# Patient Record
Sex: MALE | Race: WHITE | NOT HISPANIC OR LATINO | Employment: UNEMPLOYED | URBAN - METROPOLITAN AREA
[De-identification: names, ages, dates, MRNs, and addresses within clinical notes are randomized per-mention and may not be internally consistent; named-entity substitution may affect disease eponyms.]

---

## 2024-02-13 ENCOUNTER — OFFICE VISIT (OUTPATIENT)
Dept: FAMILY MEDICINE | Facility: CLINIC | Age: 54
End: 2024-02-13
Payer: COMMERCIAL

## 2024-02-13 VITALS
RESPIRATION RATE: 18 BRPM | SYSTOLIC BLOOD PRESSURE: 118 MMHG | OXYGEN SATURATION: 95 % | BODY MASS INDEX: 38.75 KG/M2 | TEMPERATURE: 97.6 F | HEIGHT: 69 IN | WEIGHT: 261.6 LBS | DIASTOLIC BLOOD PRESSURE: 72 MMHG | HEART RATE: 119 BPM

## 2024-02-13 DIAGNOSIS — Z11.59 NEED FOR HEPATITIS C SCREENING TEST: ICD-10-CM

## 2024-02-13 DIAGNOSIS — Z93.2 STATUS POST ILEOSTOMY (H): Primary | ICD-10-CM

## 2024-02-13 DIAGNOSIS — E11.8 DIABETES MELLITUS TYPE 2 WITH COMPLICATIONS (H): ICD-10-CM

## 2024-02-13 DIAGNOSIS — E66.812 CLASS 2 SEVERE OBESITY DUE TO EXCESS CALORIES WITH SERIOUS COMORBIDITY IN ADULT, UNSPECIFIED BMI (H): ICD-10-CM

## 2024-02-13 DIAGNOSIS — Z72.0 TOBACCO ABUSE: ICD-10-CM

## 2024-02-13 DIAGNOSIS — Z11.4 ENCOUNTER FOR SCREENING FOR HIV: ICD-10-CM

## 2024-02-13 DIAGNOSIS — I10 BENIGN ESSENTIAL HYPERTENSION: ICD-10-CM

## 2024-02-13 DIAGNOSIS — E66.01 CLASS 2 SEVERE OBESITY DUE TO EXCESS CALORIES WITH SERIOUS COMORBIDITY IN ADULT, UNSPECIFIED BMI (H): ICD-10-CM

## 2024-02-13 DIAGNOSIS — Z90.49 HISTORY OF COLON RESECTION: ICD-10-CM

## 2024-02-13 DIAGNOSIS — E78.5 HYPERLIPIDEMIA LDL GOAL <100: ICD-10-CM

## 2024-02-13 DIAGNOSIS — K21.00 GASTROESOPHAGEAL REFLUX DISEASE WITH ESOPHAGITIS WITHOUT HEMORRHAGE: ICD-10-CM

## 2024-02-13 DIAGNOSIS — F20.89 OTHER SCHIZOPHRENIA (H): ICD-10-CM

## 2024-02-13 DIAGNOSIS — F32.0 CURRENT MILD EPISODE OF MAJOR DEPRESSIVE DISORDER WITHOUT PRIOR EPISODE (H): ICD-10-CM

## 2024-02-13 PROBLEM — F20.9 SCHIZOPHRENIA (H): Status: ACTIVE | Noted: 2024-02-13

## 2024-02-13 LAB
ALBUMIN SERPL BCG-MCNC: 3.8 G/DL (ref 3.5–5.2)
ALP SERPL-CCNC: 80 U/L (ref 40–150)
ALT SERPL W P-5'-P-CCNC: 10 U/L (ref 0–70)
ANION GAP SERPL CALCULATED.3IONS-SCNC: 11 MMOL/L (ref 7–15)
AST SERPL W P-5'-P-CCNC: 13 U/L (ref 0–45)
BILIRUB SERPL-MCNC: 0.3 MG/DL
BUN SERPL-MCNC: 20.2 MG/DL (ref 6–20)
CALCIUM SERPL-MCNC: 9.9 MG/DL (ref 8.6–10)
CHLORIDE SERPL-SCNC: 103 MMOL/L (ref 98–107)
CHOLEST SERPL-MCNC: 116 MG/DL
CREAT SERPL-MCNC: 1.19 MG/DL (ref 0.67–1.17)
DEPRECATED HCO3 PLAS-SCNC: 29 MMOL/L (ref 22–29)
EGFRCR SERPLBLD CKD-EPI 2021: 72 ML/MIN/1.73M2
ERYTHROCYTE [DISTWIDTH] IN BLOOD BY AUTOMATED COUNT: 14 % (ref 10–15)
FASTING STATUS PATIENT QL REPORTED: NO
FASTING STATUS PATIENT QL REPORTED: NO
GLUCOSE SERPL-MCNC: 171 MG/DL (ref 70–99)
GLUCOSE SERPL-MCNC: 171 MG/DL (ref 70–99)
HBA1C MFR BLD: 7.2 % (ref 0–5.6)
HCT VFR BLD AUTO: 44.7 % (ref 40–53)
HDLC SERPL-MCNC: 38 MG/DL
HGB BLD-MCNC: 14 G/DL (ref 13.3–17.7)
HIV 1+2 AB+HIV1 P24 AG SERPL QL IA: NONREACTIVE
LDLC SERPL CALC-MCNC: 17 MG/DL
MCH RBC QN AUTO: 30.1 PG (ref 26.5–33)
MCHC RBC AUTO-ENTMCNC: 31.3 G/DL (ref 31.5–36.5)
MCV RBC AUTO: 96 FL (ref 78–100)
NONHDLC SERPL-MCNC: 78 MG/DL
PLATELET # BLD AUTO: 315 10E3/UL (ref 150–450)
POTASSIUM SERPL-SCNC: 4.6 MMOL/L (ref 3.4–5.3)
PROT SERPL-MCNC: 7.5 G/DL (ref 6.4–8.3)
RBC # BLD AUTO: 4.65 10E6/UL (ref 4.4–5.9)
SODIUM SERPL-SCNC: 143 MMOL/L (ref 135–145)
TRIGL SERPL-MCNC: 306 MG/DL
WBC # BLD AUTO: 7.7 10E3/UL (ref 4–11)

## 2024-02-13 PROCEDURE — 36415 COLL VENOUS BLD VENIPUNCTURE: CPT | Performed by: FAMILY MEDICINE

## 2024-02-13 PROCEDURE — 86803 HEPATITIS C AB TEST: CPT | Performed by: FAMILY MEDICINE

## 2024-02-13 PROCEDURE — 80061 LIPID PANEL: CPT | Performed by: FAMILY MEDICINE

## 2024-02-13 PROCEDURE — 83036 HEMOGLOBIN GLYCOSYLATED A1C: CPT | Performed by: FAMILY MEDICINE

## 2024-02-13 PROCEDURE — 80053 COMPREHEN METABOLIC PANEL: CPT | Performed by: FAMILY MEDICINE

## 2024-02-13 PROCEDURE — 99205 OFFICE O/P NEW HI 60 MIN: CPT | Performed by: FAMILY MEDICINE

## 2024-02-13 PROCEDURE — 85027 COMPLETE CBC AUTOMATED: CPT | Performed by: FAMILY MEDICINE

## 2024-02-13 PROCEDURE — 87389 HIV-1 AG W/HIV-1&-2 AB AG IA: CPT | Performed by: FAMILY MEDICINE

## 2024-02-13 RX ORDER — IBUPROFEN 200 MG
TABLET ORAL
Qty: 100 EACH | Refills: 3 | Status: SHIPPED | OUTPATIENT
Start: 2024-02-13 | End: 2024-05-13

## 2024-02-13 RX ORDER — RISPERIDONE 2 MG/1
2 TABLET ORAL 2 TIMES DAILY
Qty: 180 TABLET | Refills: 3 | Status: SHIPPED | OUTPATIENT
Start: 2024-02-13

## 2024-02-13 RX ORDER — METOPROLOL TARTRATE 50 MG
50 TABLET ORAL 2 TIMES DAILY
COMMUNITY
Start: 2024-01-22 | End: 2024-02-13

## 2024-02-13 RX ORDER — LANOLIN ALCOHOL/MO/W.PET/CERES
400 CREAM (GRAM) TOPICAL 3 TIMES DAILY
COMMUNITY
Start: 2024-01-22 | End: 2024-02-13

## 2024-02-13 RX ORDER — ACETAMINOPHEN 325 MG/1
325 TABLET ORAL EVERY 4 HOURS PRN
COMMUNITY
Start: 2022-08-03 | End: 2024-02-13

## 2024-02-13 RX ORDER — ASPIRIN 81 MG/1
81 TABLET, COATED ORAL DAILY
Qty: 90 TABLET | Refills: 3 | Status: SHIPPED | OUTPATIENT
Start: 2024-02-13 | End: 2024-03-27

## 2024-02-13 RX ORDER — INSULIN GLARGINE 100 [IU]/ML
6 INJECTION, SOLUTION SUBCUTANEOUS AT BEDTIME
Qty: 11 ML | Refills: 1 | Status: SHIPPED | OUTPATIENT
Start: 2024-02-13 | End: 2024-08-06

## 2024-02-13 RX ORDER — DIPHENOXYLATE HCL/ATROPINE 2.5-.025MG
1 TABLET ORAL 4 TIMES DAILY PRN
Qty: 180 TABLET | Refills: 3 | Status: SHIPPED | OUTPATIENT
Start: 2024-02-13 | End: 2025-02-12

## 2024-02-13 RX ORDER — VENLAFAXINE HYDROCHLORIDE 75 MG/1
225 CAPSULE, EXTENDED RELEASE ORAL DAILY
COMMUNITY
End: 2024-02-13

## 2024-02-13 RX ORDER — ASPIRIN 81 MG/1
81 TABLET, COATED ORAL DAILY
COMMUNITY
Start: 2024-01-22 | End: 2024-02-13

## 2024-02-13 RX ORDER — OCTREOTIDE ACETATE 100 UG/ML
100 INJECTION, SOLUTION INTRAVENOUS; SUBCUTANEOUS DAILY
Qty: 90 ML | Refills: 3 | Status: SHIPPED | OUTPATIENT
Start: 2024-02-13 | End: 2024-06-11

## 2024-02-13 RX ORDER — CLOZAPINE 200 MG/1
400 TABLET ORAL DAILY
Qty: 180 TABLET | Refills: 3 | Status: SHIPPED | OUTPATIENT
Start: 2024-02-13 | End: 2024-04-10

## 2024-02-13 RX ORDER — SODIUM BICARBONATE 650 MG/1
650 TABLET ORAL 2 TIMES DAILY
Qty: 180 TABLET | Refills: 3 | Status: SHIPPED | OUTPATIENT
Start: 2024-02-13

## 2024-02-13 RX ORDER — LANOLIN ALCOHOL/MO/W.PET/CERES
400 CREAM (GRAM) TOPICAL 3 TIMES DAILY
Qty: 270 TABLET | Refills: 3 | Status: SHIPPED | OUTPATIENT
Start: 2024-02-13 | End: 2024-03-27

## 2024-02-13 RX ORDER — ATORVASTATIN CALCIUM 40 MG/1
40 TABLET, FILM COATED ORAL DAILY
Qty: 90 TABLET | Refills: 3 | Status: SHIPPED | OUTPATIENT
Start: 2024-02-13

## 2024-02-13 RX ORDER — CLOZAPINE 50 MG/1
50 TABLET ORAL DAILY
COMMUNITY
Start: 2024-01-22 | End: 2024-02-13

## 2024-02-13 RX ORDER — CLOZAPINE 50 MG/1
50 TABLET ORAL DAILY
Qty: 90 TABLET | Refills: 3 | Status: SHIPPED | OUTPATIENT
Start: 2024-02-13 | End: 2024-04-10

## 2024-02-13 RX ORDER — ATORVASTATIN CALCIUM 40 MG/1
40 TABLET, FILM COATED ORAL DAILY
COMMUNITY
Start: 2022-05-20 | End: 2024-02-13

## 2024-02-13 RX ORDER — DIPHENOXYLATE HCL/ATROPINE 2.5-.025MG
1 TABLET ORAL 4 TIMES DAILY PRN
COMMUNITY
Start: 2022-08-11 | End: 2024-02-13

## 2024-02-13 RX ORDER — ACETAMINOPHEN 325 MG/1
325 TABLET ORAL EVERY 4 HOURS PRN
Qty: 180 TABLET | Refills: 3 | Status: SHIPPED | OUTPATIENT
Start: 2024-02-13

## 2024-02-13 RX ORDER — CLOZAPINE 200 MG/1
400 TABLET ORAL DAILY
COMMUNITY
Start: 2023-11-28 | End: 2024-02-13

## 2024-02-13 RX ORDER — LOPERAMIDE HCL 2 MG
2 CAPSULE ORAL 4 TIMES DAILY PRN
Qty: 180 CAPSULE | Refills: 3 | Status: SHIPPED | OUTPATIENT
Start: 2024-02-13

## 2024-02-13 RX ORDER — METOPROLOL TARTRATE 50 MG
50 TABLET ORAL 2 TIMES DAILY
Qty: 180 TABLET | Refills: 3 | Status: SHIPPED | OUTPATIENT
Start: 2024-02-13

## 2024-02-13 RX ORDER — RISPERIDONE 2 MG/1
2 TABLET ORAL 2 TIMES DAILY
COMMUNITY
End: 2024-02-13

## 2024-02-13 RX ORDER — SODIUM BICARBONATE 650 MG/1
650 TABLET ORAL 2 TIMES DAILY
COMMUNITY
Start: 2022-08-11 | End: 2024-02-13

## 2024-02-13 RX ORDER — INSULIN GLARGINE 100 [IU]/ML
6 INJECTION, SOLUTION SUBCUTANEOUS AT BEDTIME
COMMUNITY
End: 2024-02-13

## 2024-02-13 RX ORDER — LOPERAMIDE HCL 2 MG
2 CAPSULE ORAL 4 TIMES DAILY PRN
COMMUNITY
Start: 2022-08-11 | End: 2024-02-13

## 2024-02-13 RX ORDER — OCTREOTIDE ACETATE 100 UG/ML
100 INJECTION, SOLUTION INTRAVENOUS; SUBCUTANEOUS DAILY
COMMUNITY
Start: 2022-08-12 | End: 2024-02-13

## 2024-02-13 RX ORDER — VENLAFAXINE HYDROCHLORIDE 75 MG/1
225 CAPSULE, EXTENDED RELEASE ORAL DAILY
Qty: 270 CAPSULE | Refills: 3 | Status: SHIPPED | OUTPATIENT
Start: 2024-02-13 | End: 2025-02-12

## 2024-02-13 ASSESSMENT — ENCOUNTER SYMPTOMS
GASTROINTESTINAL NEGATIVE: 1
DECREASED CONCENTRATION: 0
FEVER: 0
ENDOCRINE NEGATIVE: 1
HEMATOLOGIC/LYMPHATIC NEGATIVE: 1
DIZZINESS: 0
HEADACHES: 0
PALPITATIONS: 0
FATIGUE: 0
HALLUCINATIONS: 0
ALLERGIC/IMMUNOLOGIC NEGATIVE: 1
SEIZURES: 0
COLOR CHANGE: 0
AGITATION: 0
CONFUSION: 0
APPETITE CHANGE: 0
COUGH: 0
NERVOUS/ANXIOUS: 0
EYE DISCHARGE: 0
ACTIVITY CHANGE: 0
WHEEZING: 0
SHORTNESS OF BREATH: 0
EYE PAIN: 0

## 2024-02-13 ASSESSMENT — PAIN SCALES - GENERAL: PAINLEVEL: NO PAIN (0)

## 2024-02-13 NOTE — LETTER
February 14, 2024      Jayden Cevallos  [address] [place] [address]        Dear Tia,    We are writing to inform you of your test results.    -Hepatitis C antibody screen test shows no signs of a previous hepatitis C infection.     Resulted Orders   HIV Antigen Antibody Combo   Result Value Ref Range    HIV Antigen Antibody Combo Nonreactive Nonreactive      Comment:      Negative HIV-1/-2 antigen and antibody screening test results usually indicate the absence of HIV-1 and HIV-2 infection. However, such negative results do not rule-out acute HIV infection.  If acute HIV-1 or HIV-2 infection is suspected, detection of HIV-1 or HIV-2 RNA  is recommended.    Hepatitis C Screen Reflex to HCV RNA Quant and Genotype   Result Value Ref Range    Hepatitis C Antibody Nonreactive Nonreactive      Comment:      A nonreactive screening test result does not exclude the possibility of exposure to or infection with HCV. Nonreactive screening test results in individuals with prior exposure to HCV may be due to antibody levels below the limit of detection of this assay or lack of reactivity to the HCV antigens used in this assay. Patients with recent HCV infections (<3 months from time of exposure) may have false-negative HCV antibody results due to the time needed for seroconversion (average of 8 to 9 weeks).   Lipid panel reflex to direct LDL Non-fasting   Result Value Ref Range    Cholesterol 116 <200 mg/dL    Triglycerides 306 (H) <150 mg/dL    Direct Measure HDL 38 (L) >=40 mg/dL    LDL Cholesterol Calculated 17 <=100 mg/dL    Non HDL Cholesterol 78 <130 mg/dL    Patient Fasting > 8hrs? No     Narrative    Cholesterol  Desirable:  <200 mg/dL    Triglycerides  Normal:  Less than 150 mg/dL  Borderline High:  150-199 mg/dL  High:  200-499 mg/dL  Very High:  Greater than or equal to 500 mg/dL    Direct Measure HDL  Female:  Greater than or equal to 50 mg/dL   Male:  Greater than or equal to 40 mg/dL    LDL  Cholesterol  Desirable:  <100mg/dL  Above Desirable:  100-129 mg/dL   Borderline High:  130-159 mg/dL   High:  160-189 mg/dL   Very High:  >= 190 mg/dL    Non HDL Cholesterol  Desirable:  130 mg/dL  Above Desirable:  130-159 mg/dL  Borderline High:  160-189 mg/dL  High:  190-219 mg/dL  Very High:  Greater than or equal to 220 mg/dL   Glucose   Result Value Ref Range    Glucose 171 (H) 70 - 99 mg/dL    Patient Fasting > 8hrs? No    Hemoglobin A1c   Result Value Ref Range    Hemoglobin A1C 7.2 (H) 0.0 - 5.6 %      Comment:      Normal <5.7%   Prediabetes 5.7-6.4%    Diabetes 6.5% or higher     Note: Adopted from ADA consensus guidelines.   CBC with platelets   Result Value Ref Range    WBC Count 7.7 4.0 - 11.0 10e3/uL    RBC Count 4.65 4.40 - 5.90 10e6/uL    Hemoglobin 14.0 13.3 - 17.7 g/dL    Hematocrit 44.7 40.0 - 53.0 %    MCV 96 78 - 100 fL    MCH 30.1 26.5 - 33.0 pg    MCHC 31.3 (L) 31.5 - 36.5 g/dL    RDW 14.0 10.0 - 15.0 %    Platelet Count 315 150 - 450 10e3/uL   Comprehensive metabolic panel (BMP + Alb, Alk Phos, ALT, AST, Total. Bili, TP)   Result Value Ref Range    Sodium 143 135 - 145 mmol/L      Comment:      Reference intervals for this test were updated on 09/26/2023 to more accurately reflect our healthy population. There may be differences in the flagging of prior results with similar values performed with this method. Interpretation of those prior results can be made in the context of the updated reference intervals.     Potassium 4.6 3.4 - 5.3 mmol/L    Carbon Dioxide (CO2) 29 22 - 29 mmol/L    Anion Gap 11 7 - 15 mmol/L    Urea Nitrogen 20.2 (H) 6.0 - 20.0 mg/dL    Creatinine 1.19 (H) 0.67 - 1.17 mg/dL    GFR Estimate 72 >60 mL/min/1.73m2    Calcium 9.9 8.6 - 10.0 mg/dL    Chloride 103 98 - 107 mmol/L    Glucose 171 (H) 70 - 99 mg/dL    Alkaline Phosphatase 80 40 - 150 U/L      Comment:      Reference intervals for this test were updated on 11/14/2023 to more accurately reflect our healthy  population. There may be differences in the flagging of prior results with similar values performed with this method. Interpretation of those prior results can be made in the context of the updated reference intervals.    AST 13 0 - 45 U/L      Comment:      Reference intervals for this test were updated on 6/12/2023 to more accurately reflect our healthy population. There may be differences in the flagging of prior results with similar values performed with this method. Interpretation of those prior results can be made in the context of the updated reference intervals.    ALT 10 0 - 70 U/L      Comment:      Reference intervals for this test were updated on 6/12/2023 to more accurately reflect our healthy population. There may be differences in the flagging of prior results with similar values performed with this method. Interpretation of those prior results can be made in the context of the updated reference intervals.      Protein Total 7.5 6.4 - 8.3 g/dL    Albumin 3.8 3.5 - 5.2 g/dL    Bilirubin Total 0.3 <=1.2 mg/dL       If you have any questions or concerns, please call the clinic at the number listed above.       Sincerely,      Dev Neville DO/michelle

## 2024-02-13 NOTE — LETTER
February 14, 2024      Jayden Cevallos  [address] [place] [address]        Dear ,    We are writing to inform you of your test results.    -Normal red blood cell (hgb) levels, normal white blood cell count and normal platelet levels.   -Cholesterol levels are at your goal levels.  ADVISE: continuing your medication, a regular exercise program with at least 150 minutes of aerobic exercise per week, and eating a low saturated fat/low carbohydrate diet.  Also, you should recheck this fasting cholesterol panel in 12 months.   -Liver and gallbladder tests are normal (ALT,AST, Alk phos, bilirubin), kidney function is stable (Cr, GFR), sodium is normal, potassium is normal, calcium is normal, glucose is elevated most likely due to your diabetes. ADVISE: recheck your labs in 12 months.   -A1C (test of diabetes control the last 2-3 months) is at your goal. Please continue with your current plan. Also, you should make an appointment to see me and recheck your A1C test in 6 months.   -HIV test is normal.     Resulted Orders   HIV Antigen Antibody Combo   Result Value Ref Range    HIV Antigen Antibody Combo Nonreactive Nonreactive      Comment:      Negative HIV-1/-2 antigen and antibody screening test results usually indicate the absence of HIV-1 and HIV-2 infection. However, such negative results do not rule-out acute HIV infection.  If acute HIV-1 or HIV-2 infection is suspected, detection of HIV-1 or HIV-2 RNA  is recommended.    Lipid panel reflex to direct LDL Non-fasting   Result Value Ref Range    Cholesterol 116 <200 mg/dL    Triglycerides 306 (H) <150 mg/dL    Direct Measure HDL 38 (L) >=40 mg/dL    LDL Cholesterol Calculated 17 <=100 mg/dL    Non HDL Cholesterol 78 <130 mg/dL    Patient Fasting > 8hrs? No     Narrative    Cholesterol  Desirable:  <200 mg/dL    Triglycerides  Normal:  Less than 150 mg/dL  Borderline High:  150-199 mg/dL  High:  200-499 mg/dL  Very High:  Greater than or equal to 500  mg/dL    Direct Measure HDL  Female:  Greater than or equal to 50 mg/dL   Male:  Greater than or equal to 40 mg/dL    LDL Cholesterol  Desirable:  <100mg/dL  Above Desirable:  100-129 mg/dL   Borderline High:  130-159 mg/dL   High:  160-189 mg/dL   Very High:  >= 190 mg/dL    Non HDL Cholesterol  Desirable:  130 mg/dL  Above Desirable:  130-159 mg/dL  Borderline High:  160-189 mg/dL  High:  190-219 mg/dL  Very High:  Greater than or equal to 220 mg/dL   Glucose   Result Value Ref Range    Glucose 171 (H) 70 - 99 mg/dL    Patient Fasting > 8hrs? No    Hemoglobin A1c   Result Value Ref Range    Hemoglobin A1C 7.2 (H) 0.0 - 5.6 %      Comment:      Normal <5.7%   Prediabetes 5.7-6.4%    Diabetes 6.5% or higher     Note: Adopted from ADA consensus guidelines.   CBC with platelets   Result Value Ref Range    WBC Count 7.7 4.0 - 11.0 10e3/uL    RBC Count 4.65 4.40 - 5.90 10e6/uL    Hemoglobin 14.0 13.3 - 17.7 g/dL    Hematocrit 44.7 40.0 - 53.0 %    MCV 96 78 - 100 fL    MCH 30.1 26.5 - 33.0 pg    MCHC 31.3 (L) 31.5 - 36.5 g/dL    RDW 14.0 10.0 - 15.0 %    Platelet Count 315 150 - 450 10e3/uL   Comprehensive metabolic panel (BMP + Alb, Alk Phos, ALT, AST, Total. Bili, TP)   Result Value Ref Range    Sodium 143 135 - 145 mmol/L      Comment:      Reference intervals for this test were updated on 09/26/2023 to more accurately reflect our healthy population. There may be differences in the flagging of prior results with similar values performed with this method. Interpretation of those prior results can be made in the context of the updated reference intervals.     Potassium 4.6 3.4 - 5.3 mmol/L    Carbon Dioxide (CO2) 29 22 - 29 mmol/L    Anion Gap 11 7 - 15 mmol/L    Urea Nitrogen 20.2 (H) 6.0 - 20.0 mg/dL    Creatinine 1.19 (H) 0.67 - 1.17 mg/dL    GFR Estimate 72 >60 mL/min/1.73m2    Calcium 9.9 8.6 - 10.0 mg/dL    Chloride 103 98 - 107 mmol/L    Glucose 171 (H) 70 - 99 mg/dL    Alkaline Phosphatase 80 40 - 150 U/L       Comment:      Reference intervals for this test were updated on 11/14/2023 to more accurately reflect our healthy population. There may be differences in the flagging of prior results with similar values performed with this method. Interpretation of those prior results can be made in the context of the updated reference intervals.    AST 13 0 - 45 U/L      Comment:      Reference intervals for this test were updated on 6/12/2023 to more accurately reflect our healthy population. There may be differences in the flagging of prior results with similar values performed with this method. Interpretation of those prior results can be made in the context of the updated reference intervals.    ALT 10 0 - 70 U/L      Comment:      Reference intervals for this test were updated on 6/12/2023 to more accurately reflect our healthy population. There may be differences in the flagging of prior results with similar values performed with this method. Interpretation of those prior results can be made in the context of the updated reference intervals.      Protein Total 7.5 6.4 - 8.3 g/dL    Albumin 3.8 3.5 - 5.2 g/dL    Bilirubin Total 0.3 <=1.2 mg/dL       If you have any questions or concerns, please call the clinic at the number listed above.       Sincerely,      Dev Neville DO/michelle

## 2024-02-13 NOTE — PATIENT INSTRUCTIONS
Adam Carpenter,    Thank you for allowing [facility] to manage your care.    I ordered some blood work, please go to the laboratory to get your laboratory studies.    I sent your prescriptions to your pharmacy.    I made a gastroenterology referral, they will be calling in approximately 1 week to set up your appointment.  If you do not hear from them, please call the specialty number on your after visit.     For your convenience, test results are released as soon as they are available  Please allow 1-2 business days for me to send you a comment about your results.  If not done so, I encourage you to login into [employer] ([email or web address] to review your results in real time.     If you have any questions or concerns, please feel free to call us at [phone].    Sincerely,    Dr. Neville    Did you know?      You can schedule a video visit for follow-up appointments as well as future appointments for certain conditions.  Please see the below link.     [email or web address]    If you have not already done so,  I encourage you to sign up for [email or web address] ([email or web address]).  This will allow you to review your results, securely communicate with a provider, and schedule virtual visits as well.

## 2024-02-13 NOTE — PROGRESS NOTES
DME (Durable Medical Equipment) Orders and Documentation  Orders Placed This Encounter   Procedures     Ostomy Supplies Order     Miscellaneous DME Order        The patient was assessed and it was determined the patient is in need of the following listed DME Supplies/Equipment. Please complete supporting documentation below to demonstrate medical necessity.

## 2024-02-13 NOTE — PROGRESS NOTES
"1. 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   - insulin syringe-needle U-100 (29G X 1/2\" 1 ML) 29G X 1/2\" 1 ML miscellaneous; Use as directed  Dispense: 100 each; Refill: 3  - Adult GI  Referral - Consult Only; Future  - Comprehensive metabolic panel (BMP + Alb, Alk Phos, ALT, AST, Total. Bili, TP); Future  - Ostomy Supplies Order  - Miscellaneous DME Order  - Comprehensive metabolic panel (BMP + Alb, Alk Phos, ALT, AST, Total. Bili, TP)    2. 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 Other schizophrenia (H)  Chronic  - cloZAPine (CLOZARIL) 50 MG tablet; Take 1 tablet (50 mg) by mouth daily  Dispense: 90 tablet; Refill: 3  - cloZAPine (CLOZARIL) 200 MG tablet; Take 2 tablets (400 mg) by mouth daily With 50 mg tablet, total of 450 mg daily  Dispense: 180 tablet; Refill: 3  - risperiDONE (RISPERDAL) 2 MG tablet; Take 1 tablet (2 mg) by mouth 2 times daily  Dispense: 180 tablet; Refill: 3    4. Current mild episode of major depressive disorder without prior episode (H24)  Chronic and stable  - venlafaxine (EFFEXOR XR) 75 MG 24 hr capsule; Take 3 capsules (225 mg) by mouth daily  Dispense: 270 capsule; Refill: 3    5. Need for hepatitis C screening test  - Hepatitis C Screen Reflex to HCV RNA Quant and Genotype; Future  - Hepatitis C Screen Reflex to HCV RNA Quant and Genotype    6. Class 2 severe obesity due to excess calories with serious comorbidity in adult, unspecified BMI (H)  Stressed the importance of diet and exercise.  Most likely attributing to patient's diabetes    7. Hyperlipidemia LDL goal <100  Chronic  - Lipid panel reflex to direct LDL Non-fasting; Future  - atorvastatin (LIPITOR) 40 MG " tablet; Take 1 tablet (40 mg) by mouth daily  Dispense: 90 tablet; Refill: 3  - Lipid panel reflex to direct LDL Non-fasting    8. Tobacco abuse  Patient does not wish to quit at this time.     9. History of colon resection  - MAGNESIUM OXIDE 400 (240 Mg) MG tablet; Take 1 tablet (400 mg) by mouth 3 times daily  Dispense: 270 tablet; Refill: 3  - octreotide (SANDOSTATIN) 100 MCG/ML SOLN injection; Inject 1 mL (100 mcg) Subcutaneous daily  Dispense: 90 mL; Refill: 3  - diphenoxylate-atropine (LOMOTIL) 2.5-0.025 MG tablet; Take 1 tablet by mouth 4 times daily as needed for diarrhea  Dispense: 180 tablet; Refill: 3  - loperamide (IMODIUM) 2 MG capsule; Take 1 capsule (2 mg) by mouth 4 times daily as needed for diarrhea  Dispense: 180 capsule; Refill: 3  - sodium bicarbonate 650 MG tablet; Take 1 tablet (650 mg) by mouth 2 times daily  Dispense: 180 tablet; Refill: 3  - acetaminophen (TYLENOL) 325 MG tablet; Take 1 tablet (325 mg) by mouth every 4 hours as needed for pain  Dispense: 180 tablet; Refill: 3  - CBC with platelets; Future  - CBC with platelets    10. Benign essential hypertension  Chronic and stable  - metoprolol tartrate (LOPRESSOR) 50 MG tablet; Take 1 tablet (50 mg) by mouth 2 times daily  Dispense: 180 tablet; Refill: 3    11. Gastroesophageal reflux disease with esophagitis without hemorrhage  - omeprazole (PRILOSEC) 20 MG DR capsule; Take 1 capsule (20 mg) by mouth daily  Dispense: 90 capsule; Refill: 3    12. Encounter for screening for HIV  - HIV Antigen Antibody Combo; Future  - HIV Antigen Antibody Combo    I spent 60 minutes with patient of which 50% was spent counseling and coordinating patient's care as well as discussing patient's plan of care.      Patricia Carpenter is a 54 year old, presenting for the following health issues:  [facility] F/U      2/13/2024     8:56 AM   Additional Questions   Roomed by Nerissa   Accompanied by Manager from [facility]         2/13/2024      8:56 AM   Patient Reported Additional Medications   Patient reports taking the following new medications See list multiple meds     HPI         Hospital Follow-up Visit:    Hospital/Nursing Home/IP Rehab Facility:  [facility]   Date of Admission: 1/28/24  Date of Discharge: 1/28/24  Reason(s) for Admission: Colostomy bag    Was your hospitalization related to COVID-19? No   Problems taking medications regularly:  None  Medication changes since discharge: None  Problems adhering to non-medication therapy:  None    Establish care    2. ER follow-up: History was very difficult due to the fact that patient is a poor historian due to being schizophrenic.  Brought in by group home care giver.  Apparently patient just recently transferred to [facility] from [place].  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  He does need assistance with colostomy bag changes.  He was recently seen in the ED for ileostomy bag leakage on 2/8/24.  Currently, there are no issues with his ileostomy but he is requiring ileostomy bag refills.  He does smoke and does not wish to quit at this time. He does not have good insight regarding his medical condition.  Regarding his schizophrenia, he is currently on clozapine and risperdal.  He is also diabetic and taking lantus     Plan of care communicated with patient and caregiver           Review of Systems   Constitutional:  Negative for activity change, appetite change, fatigue and fever.   HENT: Negative.     Eyes:  Negative for pain, discharge and visual disturbance.   Respiratory:  Negative for cough, shortness of breath and wheezing.    Cardiovascular:  Negative for chest pain, palpitations and leg swelling.   Gastrointestinal: Negative.    Endocrine: Negative.    Genitourinary: Negative.    Skin:  Negative for color change and rash.   Allergic/Immunologic: Negative.    Neurological:  Negative for dizziness, seizures and headaches.  "  Hematological: Negative.    Psychiatric/Behavioral:  Negative for agitation, confusion, decreased concentration and hallucinations. The patient is not nervous/anxious.            Objective    /72   Pulse 119   Temp 97.6  F (36.4  C) (Temporal)   Resp 18   Ht 1.74 m (5' 8.5\")   Wt 118.7 kg (261 lb 9.6 oz)   SpO2 95%   BMI 39.19 kg/m    Body mass index is 39.19 kg/m .  Physical Exam  Constitutional:       General: He is not in acute distress.     Comments: Has poor insight regarding his medical condition   HENT:      Head: Normocephalic and atraumatic.   Eyes:      Conjunctiva/sclera: Conjunctivae normal.   Cardiovascular:      Rate and Rhythm: Normal rate and regular rhythm.      Heart sounds: Normal heart sounds. No murmur heard.  Pulmonary:      Effort: Pulmonary effort is normal. No respiratory distress.      Breath sounds: No wheezing or rales.   Abdominal:      Comments: Osteomy bag intact, appears C/D/I   Musculoskeletal:         General: Normal range of motion.   Skin:     Findings: No rash.   Neurological:      Mental Status: He is alert and oriented to person, place, and time.            Signed Electronically by: Dev Neville DO    "

## 2024-02-14 LAB — HCV AB SERPL QL IA: NONREACTIVE

## 2024-02-15 ENCOUNTER — TELEPHONE (OUTPATIENT)
Dept: FAMILY MEDICINE | Facility: CLINIC | Age: 54
End: 2024-02-15
Payer: COMMERCIAL

## 2024-02-15 NOTE — TELEPHONE ENCOUNTER
Pt calling to have his meds transferred from [facility] to [place]- reviewed how he can do that on his own, pt will complete this.    Aruna, RN    Triage Nurse  [facility]

## 2024-02-22 DIAGNOSIS — Z93.2 STATUS POST ILEOSTOMY (H): Primary | ICD-10-CM

## 2024-02-22 NOTE — TELEPHONE ENCOUNTER
Reason for Call:  Other     Detailed comments: Needs a refill on Vitamin D3 1000 unit capsules. One capsule by mouth once daily.    Phone Number Patient can be reached at: [facility]) [phone]     Best Time:     Can we leave a detailed message on this number? YES    Call taken on 2/22/2024 at 1:53 PM by Jacy Santos

## 2024-02-22 NOTE — TELEPHONE ENCOUNTER
Medication  is not on current med list.    Routed to PCP.    Madalyn BALESN RN  Triage Nurse  [facility]

## 2024-02-25 RX ORDER — FAMOTIDINE 20 MG
25 TABLET ORAL DAILY
Qty: 90 CAPSULE | Refills: 3 | Status: SHIPPED | OUTPATIENT
Start: 2024-02-25

## 2024-03-21 DIAGNOSIS — Z93.2 ILEOSTOMY STATUS (H): Primary | ICD-10-CM

## 2024-03-21 NOTE — PROGRESS NOTES
Spoke with Manager for facility [facility], [phone]. Pt hs no trouble with pouches ( except last week when they ran out and send him to ED)  but patient wants ileostomy take down. No referral to GI or Stoma clinic wanted at this time.

## 2024-03-22 ENCOUNTER — TRANSFERRED RECORDS (OUTPATIENT)
Dept: HEALTH INFORMATION MANAGEMENT | Facility: CLINIC | Age: 54
End: 2024-03-22

## 2024-03-27 ENCOUNTER — TELEPHONE (OUTPATIENT)
Dept: FAMILY MEDICINE | Facility: CLINIC | Age: 54
End: 2024-03-27
Payer: COMMERCIAL

## 2024-03-27 DIAGNOSIS — E11.8 DIABETES MELLITUS TYPE 2 WITH COMPLICATIONS (H): ICD-10-CM

## 2024-03-27 DIAGNOSIS — Z90.49 HISTORY OF COLON RESECTION: ICD-10-CM

## 2024-03-27 DIAGNOSIS — F20.89 OTHER SCHIZOPHRENIA (H): Primary | ICD-10-CM

## 2024-03-27 RX ORDER — LANOLIN ALCOHOL/MO/W.PET/CERES
400 CREAM (GRAM) TOPICAL 3 TIMES DAILY
Qty: 270 TABLET | Refills: 3 | Status: SHIPPED | OUTPATIENT
Start: 2024-03-27

## 2024-03-27 RX ORDER — ASPIRIN 81 MG/1
81 TABLET, COATED ORAL DAILY
Qty: 90 TABLET | Refills: 3 | Status: SHIPPED | OUTPATIENT
Start: 2024-03-27 | End: 2024-04-10

## 2024-03-27 NOTE — TELEPHONE ENCOUNTER
This patient was a transfer patient and stable on current medications.  Most recent CBC was on 2/13/24 which was within normal limits.  Psychiatry referral placed.    1. Other schizophrenia (H)  - Adult Mental Health [facility] Referral; Future

## 2024-03-27 NOTE — TELEPHONE ENCOUNTER
Dianne with patient's [facility] calling to have Mg oxide and ASA scripts resent to [facility] instead (was previously sent to [facility])  Prescriptions resent to [facility] pharmacy as requested.    Dianne also inquired about Clozapine.  Updated her on pharmacy's message, provider's message, and MH referral.  Gave phone number to schedule with psych and explained that prescription will need to come from a psych provider.   She does not think patient has a current psychiatrist.  She stated that patient was transferred from a facility that was managing and prescribing meds.  She will have their manager schedule patient with psych    Called and updated Rohan with [facility] pharmacy as well    Missy Valenzuela RN  [facility]

## 2024-03-27 NOTE — TELEPHONE ENCOUNTER
Received call from Carine, Pharmacy Technician with [facility] in [place]. She is calling regarding pt's prescription for Clozapine (RN noted this was sent to [facility] on 2/13/24, script was then transferred to [facility].) She is asking if pt has a psychiatrist? Reviewed chart and could not find any information regarding pt's psychiatrist. States Dr. Neville prescribed Clozapine and is not registered in the [facility] program to prescribe this medication. Pt is also needing monthly CBC with diff and last had this done on 1/1/24.   Pt takes a total dose of 450mg. Pt is out of the 200mg dose. Pharmacy is needing new prescriptions for the 200mg dose and 50mg dose with the name of pt's psychiatrist.     Routing to Provider to advise.    Rosario Land RN  [facility]

## 2024-04-02 ENCOUNTER — TELEPHONE (OUTPATIENT)
Dept: FAMILY MEDICINE | Facility: CLINIC | Age: 54
End: 2024-04-02

## 2024-04-02 ENCOUNTER — PATIENT OUTREACH (OUTPATIENT)
Dept: CARE COORDINATION | Facility: CLINIC | Age: 54
End: 2024-04-02

## 2024-04-02 ENCOUNTER — VIRTUAL VISIT (OUTPATIENT)
Dept: FAMILY MEDICINE | Facility: CLINIC | Age: 54
End: 2024-04-02
Payer: COMMERCIAL

## 2024-04-02 DIAGNOSIS — F20.89 OTHER SCHIZOPHRENIA (H): Primary | ICD-10-CM

## 2024-04-02 DIAGNOSIS — Z90.49 HISTORY OF COLON RESECTION: ICD-10-CM

## 2024-04-02 DIAGNOSIS — E11.8 DIABETES MELLITUS TYPE 2 WITH COMPLICATIONS (H): ICD-10-CM

## 2024-04-02 DIAGNOSIS — F20.89 OTHER SCHIZOPHRENIA (H): ICD-10-CM

## 2024-04-02 PROCEDURE — 99442 PR PHYSICIAN TELEPHONE EVALUATION 11-20 MIN: CPT | Mod: 93 | Performed by: FAMILY MEDICINE

## 2024-04-02 ASSESSMENT — ENCOUNTER SYMPTOMS
ENDOCRINE NEGATIVE: 1
GASTROINTESTINAL NEGATIVE: 1
WHEEZING: 0
DECREASED CONCENTRATION: 0
EYE DISCHARGE: 0
SHORTNESS OF BREATH: 0
ALLERGIC/IMMUNOLOGIC NEGATIVE: 1
DIZZINESS: 0
PALPITATIONS: 0
SEIZURES: 0
AGITATION: 0
HALLUCINATIONS: 0
FATIGUE: 0
HEADACHES: 0
COLOR CHANGE: 0
EYE PAIN: 0
CONFUSION: 0
FEVER: 0
COUGH: 0
APPETITE CHANGE: 0
ACTIVITY CHANGE: 0
HEMATOLOGIC/LYMPHATIC NEGATIVE: 1
NERVOUS/ANXIOUS: 0

## 2024-04-02 ASSESSMENT — PATIENT HEALTH QUESTIONNAIRE - PHQ9
SUM OF ALL RESPONSES TO PHQ QUESTIONS 1-9: 4
SUM OF ALL RESPONSES TO PHQ QUESTIONS 1-9: 4
10. IF YOU CHECKED OFF ANY PROBLEMS, HOW DIFFICULT HAVE THESE PROBLEMS MADE IT FOR YOU TO DO YOUR WORK, TAKE CARE OF THINGS AT HOME, OR GET ALONG WITH OTHER PEOPLE: NOT DIFFICULT AT ALL

## 2024-04-02 ASSESSMENT — ANXIETY QUESTIONNAIRES
GAD7 TOTAL SCORE: 2
GAD7 TOTAL SCORE: 2
7. FEELING AFRAID AS IF SOMETHING AWFUL MIGHT HAPPEN: NOT AT ALL
3. WORRYING TOO MUCH ABOUT DIFFERENT THINGS: NOT AT ALL
8. IF YOU CHECKED OFF ANY PROBLEMS, HOW DIFFICULT HAVE THESE MADE IT FOR YOU TO DO YOUR WORK, TAKE CARE OF THINGS AT HOME, OR GET ALONG WITH OTHER PEOPLE?: NOT DIFFICULT AT ALL
5. BEING SO RESTLESS THAT IT IS HARD TO SIT STILL: NOT AT ALL
GAD7 TOTAL SCORE: 2
7. FEELING AFRAID AS IF SOMETHING AWFUL MIGHT HAPPEN: NOT AT ALL
2. NOT BEING ABLE TO STOP OR CONTROL WORRYING: NOT AT ALL
IF YOU CHECKED OFF ANY PROBLEMS ON THIS QUESTIONNAIRE, HOW DIFFICULT HAVE THESE PROBLEMS MADE IT FOR YOU TO DO YOUR WORK, TAKE CARE OF THINGS AT HOME, OR GET ALONG WITH OTHER PEOPLE: NOT DIFFICULT AT ALL
6. BECOMING EASILY ANNOYED OR IRRITABLE: SEVERAL DAYS
4. TROUBLE RELAXING: SEVERAL DAYS
1. FEELING NERVOUS, ANXIOUS, OR ON EDGE: NOT AT ALL

## 2024-04-02 NOTE — PROGRESS NOTES
Jayden is a 54 year old who is being evaluated via a billable telephone visit.    What phone number would you like to be contacted at? [phone]  How would you like to obtain your AVS? Mail a copy  Originating Location (pt. Location): Assisted Living    Distant Location (provider location):  Off-site    1. Other schizophrenia (H)  Chronic and stable. Continue with Clozapine total 450 mg daily.  Keep upcoming psychiatry appointment.     2. 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  Continue with Lantus.     3. 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  Keep upcoming appointment with GI.   - Ostomy Supplies Order      Subjective   Jayden is a 54 year old, presenting for the following health issues:  Establish Care and Recheck Medication      4/2/2024    10:42 AM   Additional Questions   Roomed by Nerissa   Accompanied by Self     History of Present Illness       Mental Health Follow-up:  Patient presents to follow-up on Anxiety.    Patient's anxiety since last visit has been:  Better  The patient is not having other symptoms associated with anxiety.  Any significant life events: No  Patient is not feeling anxious or having panic attacks.  Patient has no concerns about alcohol or drug use.    He eats 0-1 servings of fruits and vegetables daily.He consumes 3 sweetened beverage(s) daily.He exercises with enough effort to increase his heart rate 10 to 19 minutes per day.  He exercises with enough effort to increase his heart rate 3 or less days per week.   He is taking medications regularly.      History of megacolon: S/P ileostomy.  Patient has upcoming gastroenterology in 2 weeks in [place].  No nausea or vomiting.  Patient has enough supplies.     2. Schizophrenia: Currently on clozapine.  Has an upcoming with psychiatry appointment.  States of mild auditory hallucinations.     Review of Systems   Constitutional:  Negative for activity change, appetite change,  fatigue and fever.   HENT: Negative.     Eyes:  Negative for pain, discharge and visual disturbance.   Respiratory:  Negative for cough, shortness of breath and wheezing.    Cardiovascular:  Negative for chest pain, palpitations and leg swelling.   Gastrointestinal: Negative.    Endocrine: Negative.    Genitourinary: Negative.    Skin:  Negative for color change and rash.   Allergic/Immunologic: Negative.    Neurological:  Negative for dizziness, seizures and headaches.   Hematological: Negative.    Psychiatric/Behavioral:  Negative for agitation, confusion, decreased concentration and hallucinations. The patient is not nervous/anxious.            Objective           Vitals:  No vitals were obtained today due to virtual visit.    Physical Exam   General: Alert and no distress //Respiratory: No audible wheeze, cough, or shortness of breath // Psychiatric:  Appropriate affect, tone, and pace of words            Phone call duration: 15  minutes  Signed Electronically by: Dev Neville DO    DME (Durable Medical Equipment) Orders and Documentation  Orders Placed This Encounter   Procedures    Ostomy Supplies Order        The patient was assessed and it was determined the patient is in need of the following listed DME Supplies/Equipment. Please complete supporting documentation below to demonstrate medical necessity.

## 2024-04-02 NOTE — TELEPHONE ENCOUNTER
Poppy manager at home stating that the patient needs the patients provider to manage his psychiatry medication (clozapine)  while he waits for his appointment in June (6/19/24). They are in need of clozapine fills. RN updated that these were sent to the pharmacy 2/13/24 with refills.     He stated [facility] already transferred these to their pharmacy and will not dispense these because they were not prescribed by a psychiatrist. Poppy stated he was told that his nurse at the facility spoke to [facility] and this is what they told the nurse.     He then put the [facility] on the call with us. They were told that they were not able to fill this because Dr. Neville is not REMs program. They stated Dr. Neville will need to be enrolled in this program. They stated he has to have blood draws each month. RN updated that the patient had a blood draw 2/13/24 and they were not willing to release this medication.     The pharmacy staff recommended doing [place] [facility] [place] the provider Dr. Neville cannot enroll this medication.     Poppy stated he will try to have the scripts transferred back to [facility] to see if they can pick them up at that location and he will update us with that.    Patient has enough medication for this week.     Please advise    Cassie Sands RN on 4/2/2024 at 2:06 PM

## 2024-04-02 NOTE — TELEPHONE ENCOUNTER
Please check if his previous psychiatrist are willing to prescribe his Clozapine and Risperdal until he establish care with a psychiatrist on 6/19/24.  I am not a psychiatrist and not enrolled in the REMs program.  Please let me know how else I can help the patient.

## 2024-04-02 NOTE — TELEPHONE ENCOUNTER
Poppy stating that the patient needs the patients provider to manage his psychiatry medications while he waits for his appointment in June (6/19/24). They are in need of clozapine fills. RN updated that these were sent to the pharmacy 2/13/24 with refills.     He stated [place] pharmacy already transferred these and will not dispense these because they were not prescribed by a psychiatrist.

## 2024-04-02 NOTE — TELEPHONE ENCOUNTER
RN called Poppy to relay pcp's message below. Poppy was asking for additional support for helping coordinate this as they are unable to find pt's previous psychiatrist. RN placed care coordination referral to help with medication coverages.     Lor Chery RN on 4/2/2024 at 2:58 PM

## 2024-04-03 ENCOUNTER — PATIENT OUTREACH (OUTPATIENT)
Dept: CARE COORDINATION | Facility: CLINIC | Age: 54
End: 2024-04-03
Payer: COMMERCIAL

## 2024-04-03 NOTE — PROGRESS NOTES
Clinic Care Coordination Contact  [facility]/Voicemail    Clinical Data: Care Coordinator Outreach    Outreach Documentation Number of Outreach Attempt   4/3/2024   3:18 PM 1       The CHW was not able to leave a voicemail for the patient at this time.    Plan: Care Coordinator will try to reach patient again in 1-2 business days.    OMID Bui  [phone]  [facility]

## 2024-04-04 NOTE — PROGRESS NOTES
Clinic Care Coordination Contact  Community Health Worker Initial Outreach    CHW Initial Information Gathering:  Referral Source: PCP  Preferred Hospital: [facility]  [phone]  Preferred Urgent Care: Other ([facility] [place])  Current living arrangement:: I live in assisted living  Type of residence:: Assisted living  No PCP office visit in Past Year: No       Patient accepts CC: Yes. Patient scheduled for assessment with the SW on 4/5/24 at 2:00 pm. Patient noted desire to discuss supports for mental health, treatment options and also Pt's home care manager Poppy is seeking assistance in getting medications for bridging. The CHW called the patient through the only number in the chart.     OMID Bui  [phone]  [facility]  [facility]

## 2024-04-05 ENCOUNTER — PATIENT OUTREACH (OUTPATIENT)
Dept: NURSING | Facility: CLINIC | Age: 54
End: 2024-04-05
Payer: COMMERCIAL

## 2024-04-05 NOTE — PROGRESS NOTES
"Clinic Care Coordination Contact     Call to patient for initial SW phone assessment, a female answered the phone.  SW inquired if she could talk with patient.  Patient then answered, he stated that the female is Amita, his PCA.  Patient states his PCA provides care 5 days per week \"all day.  SW clarified with PCA, she stated she is from [facility] [place], she provides care 8 hours per day M-F.  PCA reported she assists with bathing, dressing and homemaking needs.  She reported that patient has an ileostomy and receives wound and medical care from home care RN.      CC referral notes indicate need for mental health and treatment options.  Patient has dx of schizophrenia with auditory hallucinations noted.  Patient had reported in recent visit that his anxiety has improved and he is not having panic attacks.  Patient declined need for mental health resources.  He reports having an upcoming psychiatry appt as does PCA.  PCA was not certain of the date, but stated it is scheduled within [place].  SW noted that a psychiatry appt was scheduled in June 2024 but canceled.  Patient's PCA stated she will discuss with patient's [place]  and home care RN.  Both patient and PCA reported that patient has an assigned  wish [facility].  Patient reported he does not wish to see a therapist at time.      SW inquired with patient and PCA on possible needed medications as noted in CC referral.  PCA stated patient has all needed medications, as did patient.  SW inquired of patient if she could have patient's home care RN # to call and ensure nothing further is needed.  Patient handed phone to PCA, requesting she provide this #.  PCA provided # of Home Care RN Lameesa ([phone]).  SW left vague message for Lameesa requesting return call regarding patient.      SW will review in 1-2 days on next steps     Amada Pollock, CHINW, MSW   [facility]  Care Coordination  [facility]" [facility] [place] [facility]  [phone]  4/5/2024 2:26 PM

## 2024-04-08 NOTE — PROGRESS NOTES
Clinic Care Coordination Contact  [facility]/Voicemail    Clinical Data: Care Coordinator Outreach    SW had left message for Lameesa.  Lameesa left message for SW requesting return call.  SW left return message today     Outreach Documentation Number of Outreach Attempt   4/3/2024   3:18 PM 1   4/8/2024   3:38 PM 1       Left message on  Lameesa's   voicemail with call back information and requested return call.SW encouraged him to call the clinic to discuss concerns/questions regarding patient's medications.  SW provided that contact information.    Plan: Care Coordinator will await return call from Lameesa.      Amada Pollock, JONNIE, MSW   [facility]  Care Coordination  [facility]  [phone]  4/8/2024 3:38 PM

## 2024-04-10 ENCOUNTER — TELEPHONE (OUTPATIENT)
Dept: FAMILY MEDICINE | Facility: CLINIC | Age: 54
End: 2024-04-10
Payer: COMMERCIAL

## 2024-04-10 DIAGNOSIS — E11.8 DIABETES MELLITUS TYPE 2 WITH COMPLICATIONS (H): ICD-10-CM

## 2024-04-10 DIAGNOSIS — F20.89 OTHER SCHIZOPHRENIA (H): ICD-10-CM

## 2024-04-10 RX ORDER — ASPIRIN 81 MG/1
81 TABLET, COATED ORAL DAILY
Qty: 90 TABLET | Refills: 3 | Status: SHIPPED | OUTPATIENT
Start: 2024-04-10

## 2024-04-10 NOTE — TELEPHONE ENCOUNTER
Home Care Nurse Ralph, with [facility] is calling.   PH: [phone]        Patient needs a refill on his clonazapine, and he needs labs done every month while he is on this med in order to receive this.  Home care nurse thinks it is a CBC.  He will also need refills on this med      He can not get unto psychiatrist until June.    He currently takes cloZAPine (CLOZARIL) 200 mg and 50 mg tablets.  Take 2 tablets (400 mg) by mouth daily With 50 mg       Routed to provider to please advise.    Madalyn FRIED RN  Triage Nurse  [facility]

## 2024-04-10 NOTE — TELEPHONE ENCOUNTER
"Po[facility] calling, states that patient is now out of his Aspirin. Notes that he was told this had been sent to patient's pharmacy but pharmacy did not have on file.    Writer noted that script had been sent to [facility]. Poppy states that is the wrong location, asking to please have resent to [facility].    Writer \"changed\" script and resent as fulfilled script to [facility]. No further questions.    FARIHA Buenrostro RN  [facility]    "

## 2024-04-11 RX ORDER — CLOZAPINE 200 MG/1
400 TABLET ORAL DAILY
Qty: 180 TABLET | Refills: 3 | Status: SHIPPED | OUTPATIENT
Start: 2024-04-11

## 2024-04-11 RX ORDER — CLOZAPINE 50 MG/1
50 TABLET ORAL DAILY
Qty: 90 TABLET | Refills: 3 | Status: SHIPPED | OUTPATIENT
Start: 2024-04-11

## 2024-04-16 ENCOUNTER — TELEPHONE (OUTPATIENT)
Dept: FAMILY MEDICINE | Facility: CLINIC | Age: 54
End: 2024-04-16

## 2024-04-16 ENCOUNTER — APPOINTMENT (OUTPATIENT)
Dept: CT IMAGING | Facility: CLINIC | Age: 54
End: 2024-04-16
Attending: EMERGENCY MEDICINE
Payer: COMMERCIAL

## 2024-04-16 ENCOUNTER — HOSPITAL ENCOUNTER (EMERGENCY)
Facility: CLINIC | Age: 54
Discharge: HOME | End: 2024-04-16
Attending: EMERGENCY MEDICINE | Admitting: EMERGENCY MEDICINE
Payer: COMMERCIAL

## 2024-04-16 VITALS — SYSTOLIC BLOOD PRESSURE: 153 MMHG | DIASTOLIC BLOOD PRESSURE: 92 MMHG | HEART RATE: 86 BPM | OXYGEN SATURATION: 93 %

## 2024-04-16 DIAGNOSIS — Z93.2 STATUS POST ILEOSTOMY (H): ICD-10-CM

## 2024-04-16 DIAGNOSIS — E11.8 DIABETES MELLITUS TYPE 2 WITH COMPLICATIONS (H): Primary | ICD-10-CM

## 2024-04-16 DIAGNOSIS — L24.A2 IRRITANT CONTACT DERMATITIS DUE TO FECAL INCONTINENCE: ICD-10-CM

## 2024-04-16 DIAGNOSIS — Z90.49 HISTORY OF COLON RESECTION: ICD-10-CM

## 2024-04-16 DIAGNOSIS — K94.03 COLOSTOMY MALFUNCTION (H): ICD-10-CM

## 2024-04-16 DIAGNOSIS — F20.89 OTHER SCHIZOPHRENIA (H): ICD-10-CM

## 2024-04-16 DIAGNOSIS — F32.0 CURRENT MILD EPISODE OF MAJOR DEPRESSIVE DISORDER WITHOUT PRIOR EPISODE (H): ICD-10-CM

## 2024-04-16 PROBLEM — J96.01 ACUTE HYPOXEMIC RESPIRATORY FAILURE (H): Status: ACTIVE | Noted: 2024-04-16

## 2024-04-16 PROBLEM — N17.9 ACUTE KIDNEY INJURY (H): Status: ACTIVE | Noted: 2022-07-31

## 2024-04-16 PROBLEM — K56.609 SMALL BOWEL OBSTRUCTION (H): Status: ACTIVE | Noted: 2019-11-18

## 2024-04-16 PROBLEM — G25.71 DRUG-INDUCED AKATHISIA: Status: ACTIVE | Noted: 2019-05-22

## 2024-04-16 PROBLEM — R17 ELEVATED BILIRUBIN: Status: ACTIVE | Noted: 2019-11-19

## 2024-04-16 PROBLEM — J98.11 ATELECTASIS: Status: ACTIVE | Noted: 2018-12-18

## 2024-04-16 PROBLEM — E87.5 HYPERKALEMIA: Status: ACTIVE | Noted: 2019-11-19

## 2024-04-16 LAB
ANION GAP SERPL CALCULATED.3IONS-SCNC: 12 MMOL/L (ref 7–15)
BASOPHILS # BLD AUTO: 0.1 10E3/UL (ref 0–0.2)
BASOPHILS NFR BLD AUTO: 1 %
BUN SERPL-MCNC: 18.2 MG/DL (ref 6–20)
CALCIUM SERPL-MCNC: 9.3 MG/DL (ref 8.6–10)
CHLORIDE SERPL-SCNC: 98 MMOL/L (ref 98–107)
CREAT SERPL-MCNC: 1.38 MG/DL (ref 0.67–1.17)
DEPRECATED HCO3 PLAS-SCNC: 28 MMOL/L (ref 22–29)
EGFRCR SERPLBLD CKD-EPI 2021: 60 ML/MIN/1.73M2
EOSINOPHIL # BLD AUTO: 0.3 10E3/UL (ref 0–0.7)
EOSINOPHIL NFR BLD AUTO: 3 %
ERYTHROCYTE [DISTWIDTH] IN BLOOD BY AUTOMATED COUNT: 13.4 % (ref 10–15)
GLUCOSE SERPL-MCNC: 158 MG/DL (ref 70–99)
HCT VFR BLD AUTO: 42.7 % (ref 40–53)
HGB BLD-MCNC: 14.1 G/DL (ref 13.3–17.7)
IMM GRANULOCYTES # BLD: 0 10E3/UL
IMM GRANULOCYTES NFR BLD: 0 %
LYMPHOCYTES # BLD AUTO: 2.1 10E3/UL (ref 0.8–5.3)
LYMPHOCYTES NFR BLD AUTO: 21 %
MCH RBC QN AUTO: 30.9 PG (ref 26.5–33)
MCHC RBC AUTO-ENTMCNC: 33 G/DL (ref 31.5–36.5)
MCV RBC AUTO: 93 FL (ref 78–100)
MONOCYTES # BLD AUTO: 0.6 10E3/UL (ref 0–1.3)
MONOCYTES NFR BLD AUTO: 6 %
NEUTROPHILS # BLD AUTO: 6.7 10E3/UL (ref 1.6–8.3)
NEUTROPHILS NFR BLD AUTO: 69 %
NRBC # BLD AUTO: 0 10E3/UL
NRBC BLD AUTO-RTO: 0 /100
PLATELET # BLD AUTO: 264 10E3/UL (ref 150–450)
POTASSIUM SERPL-SCNC: 4 MMOL/L (ref 3.4–5.3)
RBC # BLD AUTO: 4.57 10E6/UL (ref 4.4–5.9)
SODIUM SERPL-SCNC: 138 MMOL/L (ref 135–145)
WBC # BLD AUTO: 9.7 10E3/UL (ref 4–11)

## 2024-04-16 PROCEDURE — 85025 COMPLETE CBC W/AUTO DIFF WBC: CPT | Performed by: EMERGENCY MEDICINE

## 2024-04-16 PROCEDURE — 80048 BASIC METABOLIC PNL TOTAL CA: CPT | Performed by: EMERGENCY MEDICINE

## 2024-04-16 PROCEDURE — 99285 EMERGENCY DEPT VISIT HI MDM: CPT | Mod: 25 | Performed by: EMERGENCY MEDICINE

## 2024-04-16 PROCEDURE — 99284 EMERGENCY DEPT VISIT MOD MDM: CPT | Performed by: EMERGENCY MEDICINE

## 2024-04-16 PROCEDURE — 250N000011 HC RX IP 250 OP 636: Performed by: EMERGENCY MEDICINE

## 2024-04-16 PROCEDURE — 74177 CT ABD & PELVIS W/CONTRAST: CPT

## 2024-04-16 PROCEDURE — 36415 COLL VENOUS BLD VENIPUNCTURE: CPT | Performed by: EMERGENCY MEDICINE

## 2024-04-16 PROCEDURE — 250N000009 HC RX 250: Performed by: EMERGENCY MEDICINE

## 2024-04-16 RX ORDER — IOPAMIDOL 755 MG/ML
100 INJECTION, SOLUTION INTRAVASCULAR ONCE
Status: COMPLETED | OUTPATIENT
Start: 2024-04-16 | End: 2024-04-16

## 2024-04-16 RX ADMIN — IOPAMIDOL 100 ML: 755 INJECTION, SOLUTION INTRAVENOUS at 20:14

## 2024-04-16 RX ADMIN — SODIUM CHLORIDE 72 ML: 9 INJECTION, SOLUTION INTRAVENOUS at 20:14

## 2024-04-16 ASSESSMENT — ACTIVITIES OF DAILY LIVING (ADL)
ADLS_ACUITY_SCORE: 35

## 2024-04-16 ASSESSMENT — COLUMBIA-SUICIDE SEVERITY RATING SCALE - C-SSRS
1. IN THE PAST MONTH, HAVE YOU WISHED YOU WERE DEAD OR WISHED YOU COULD GO TO SLEEP AND NOT WAKE UP?: NO
2. HAVE YOU ACTUALLY HAD ANY THOUGHTS OF KILLING YOURSELF IN THE PAST MONTH?: NO
6. HAVE YOU EVER DONE ANYTHING, STARTED TO DO ANYTHING, OR PREPARED TO DO ANYTHING TO END YOUR LIFE?: NO

## 2024-04-16 NOTE — TELEPHONE ENCOUNTER
Reason for Call:  Other     Detailed comments: Patient is out of colostomy bags and needs a new script sent to [facility].    Phone Number Patient can be reached at: [facility] ([facility]) [phone]    Best Time:     Can we leave a detailed message on this number? YES    Call taken on 4/16/2024 at 11:43 AM by Jacy Santos

## 2024-04-16 NOTE — TELEPHONE ENCOUNTER
Poppy,  from [facility] in regards to colostomy supplies.     Patient is out of colostomy supplies and is currently leaking stool out of ostomy site. Patient has been placing tape over site to prevent dripping, leaking. Patient lives in a group home type setting and lives with other residents. Poppy wondering if urgent care has ostomy supplies. Informed  urgent care does not have ostomy supplies. Poppy reports insurance will not cover ostomy supplies. Encouraged Poppy to work with patient insurance and determine what is needed to cover supplies.     Recommended ER if patient does not have supplies, as only an ER type facility will have supplies at this time for the patient.    Provider: is it recommended to start prior auth for ostomy supplies? Please have staff contact home to determine how many bags patient uses a month. Also, placing care coordination referral to assist with medical supplies.    Marybel Antonio RN

## 2024-04-16 NOTE — TELEPHONE ENCOUNTER
Rn called Home care for clarification of ostomy supplies. Pt uses one piece colostomy bags.     OK for for DME?    Lor Chery RN on 4/16/2024 at 12:05 PM

## 2024-04-16 NOTE — ED TRIAGE NOTES
BIBA from a group home due to running out of colostomy bags. The pt was here yesterday and sent home with supplies. The supplies did not fit and his colostomy bag started to leak. Pt used duck tape to stop the leak. The pt had no other complaints.

## 2024-04-17 NOTE — TELEPHONE ENCOUNTER
Called Poppy, he said he has reached out to psychiatry and left a message to call him back.     He does not know about the ostomy bags at this time.     Laverne Arana RN

## 2024-04-17 NOTE — TELEPHONE ENCOUNTER
Thank you for the thorough note!  Agree with below plan regarding colostomy supply and keeping appointment with GI.  No, patient can not be without Clozapine until June.  Since pharmacy will not accept my prescription, patient needs to contact previous psychiatrist to prescribe this otherwise his Schizophrenia will not be controlled.

## 2024-04-17 NOTE — TELEPHONE ENCOUNTER
Patient was seen in the ER yesterday for osteomy malfunction and supply issues.  Please check patient's status to ensure patient has enough osteomy supplies moving forward.  Also, I made a GI referral as well as ordered osteomy supplies on 2/13/24 so patient can establish with our specialist to monitor is osteomy and have enough supplies.  Did gastroenterology reach out to the patient?  Did they ever  the supplies?  Patient has Schizophrenia and may need assistance with coordination of care.

## 2024-04-17 NOTE — ED NOTES
Patient had stool all over stomach area and top of pants. Lina and I cleaned patient up, put down a new chux , we applied barrier cream in groin area, and placed a depends on patient.

## 2024-04-17 NOTE — DISCHARGE INSTRUCTIONS
Keep skin on abdomen clean and dry. No lotions or harsh soaps. Pat skin dry after cleaning and then apply barrier cream likely Aquaphor or petroleum jelly. Please follow up with Dr. Neville and with  for follow up and ostomy care.     If your symptoms worsen or you develop new or concerning symptoms, please return to the Emergency Department for further evaluation and treatment.      Follow up with Urology as planned for management of ureteral stent.

## 2024-04-17 NOTE — TELEPHONE ENCOUNTER
Called patient at [phone]. Left message to return call to the clinic.    Called Poppy, voice mailbox is full and unable to leave a message.     Laverne Arana RN

## 2024-04-17 NOTE — ED NOTES
Applied vasiline to wounds all over abd area, then applied telfa over wounds and used tape to hold in place. Lina CRUZ emptied colostomy bag. Helped paytient put on paper clothing to go home and placed soiled clothes and belongings in a bag.

## 2024-04-17 NOTE — ED PROVIDER NOTES
History     Chief Complaint   Patient presents with    colostomy bag problem     HPI  Jayden Cevallos is a 54 year old male with history notable for schizophrenia, resides in group home, colostomy who was BIBA w/ problems with ostomy supplies. Patient says he ran out of ostomy supplies and used duct tape.  Says that bag was leaking and he put duct tape on yesterday.  Denies any nausea or vomiting.  Denies any fevers.  No staff from [facility] present with him at this time.    Chart review shows that he was at [facility] yesterday with leakage around ostomy bag and notes reviewed DEC tape was applied to help hold blood was leaking.  In review of that ED note there was no mention of any irritation of skin.  Do not feel lab work or imaging is necessary and was discharged with ostomy supplies.   unclear to me what was wrong with supplies given at [facility].  Or, why he applied duct tape to his abdomen with an approximate size of 12 inches x 12 inches.    The patient's PMHx, Surgical Hx, Allergies, and Medications were all reviewed with the patient.    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) 02/13/2024     Priority: Medium    Class 2 severe obesity due to excess calories with serious comorbidity in adult (H) 02/13/2024     Priority: Medium    Schizophrenia (H) 02/13/2024     Priority: Medium    Hyperlipidemia LDL goal <100 02/13/2024     Priority: Medium    Tobacco abuse 02/13/2024     Priority: Medium    Status post ileostomy (H) 02/13/2024     Priority: Medium    Acute kidney injury (H24) 07/31/2022     Priority: Medium    Hyperkalemia  11/19/2019     Priority: Medium    Elevated bilirubin 11/19/2019     Priority: Medium    Small bowel obstruction (H) 11/18/2019     Priority: Medium    Drug-induced akathisia 05/22/2019     Priority: Medium    Atelectasis 12/18/2018     Priority: Medium    Adrenal mass, right (H24) 06/03/2016     Priority: Medium    Partial obstruction of small intestine (H) 06/03/2016     Priority: Medium    Stage 3 chronic kidney disease (H) 05/18/2015     Priority: Medium     Formatting of this note might be different from the original.   Updated per 10/1/17 IMO import  Formatting of this note might be different from the original.   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)     Hyperlipidemia LDL goal <100     Schizophrenia (H)     Tobacco abuse        Past Surgical History:    Past Surgical History:   Procedure Laterality Date    osteomy  2016       Family History:    Family History   Problem Relation Age of Onset    Alcoholism Father        Social History:  Marital Status:  Single [1]  Social History     Tobacco Use    Smoking status: Every Day     Current packs/day: 0.50     Types: Cigarettes     Passive exposure: Current    Smokeless tobacco: Never    Tobacco comments:     Prior drinker in 80 and 90s   Vaping Use    Vaping status: Never Used   Substance Use Topics    Drug use: Never        Medications:    acetaminophen (TYLENOL) 325 MG tablet  ASPIRIN LOW DOSE 81 MG EC tablet  atorvastatin (LIPITOR) 40 MG tablet  cloZAPine (CLOZARIL) 200 MG tablet  cloZAPine (CLOZARIL) 50 MG tablet  diphenoxylate-atropine (LOMOTIL) 2.5-0.025 MG tablet  insulin  "syringe-needle U-100 (29G X 1/2\" 1 ML) 29G X 1/2\" 1 ML miscellaneous  LANTUS SOLOSTAR 100 UNIT/ML soln  loperamide (IMODIUM) 2 MG capsule  MAGNESIUM OXIDE 400 (240 Mg) MG tablet  metoprolol tartrate (LOPRESSOR) 50 MG tablet  octreotide (SANDOSTATIN) 100 MCG/ML SOLN injection  omeprazole (PRILOSEC) 20 MG DR capsule  risperiDONE (RISPERDAL) 2 MG tablet  sodium bicarbonate 650 MG tablet  venlafaxine (EFFEXOR XR) 75 MG 24 hr capsule  Vitamin D, Cholecalciferol, 25 MCG (1000 UT) CAPS          Review of Systems  Pertinent positives and negatives mentioned in HPI    Physical Exam   BP: 126/89  Pulse: 86  SpO2: 93 %    GEN: Awake, alert, and cooperative.   HENT: oral mucosa dry.   CV : Extremities warm and well-perfused   PULM: Normal effort.  Speaking in full sentences no audible wheezing  ABD: Abdomen is soft but distended.  No tenderness to palpation.  Ostomy is pink problem patent.  Surrounding skin is erythematous and area which was covered by duct tape as well as dependent tissue and groin.  See photo below  NEURO: Normal speech. Following commands. Answering questions and interacting appropriately.   EXT: No gross deformity. Warm and well perfused.  INT: Warm. No diaphoresis. Normal color.            ED Course        Procedures                 Critical Care time:  none               Results for orders placed or performed during the hospital encounter of 04/16/24 (from the past 24 hour(s))   CBC with platelets differential    Narrative    The following orders were created for panel order CBC with platelets differential.  Procedure                               Abnormality         Status                     ---------                               -----------         ------                     CBC with platelets and d...[[id number]]                      Final result                 Please view results for these tests on the individual orders.   Basic metabolic panel   Result Value Ref Range    Sodium 138 135 - 145 mmol/L "    Potassium 4.0 3.4 - 5.3 mmol/L    Chloride 98 98 - 107 mmol/L    Carbon Dioxide (CO2) 28 22 - 29 mmol/L    Anion Gap 12 7 - 15 mmol/L    Urea Nitrogen 18.2 6.0 - 20.0 mg/dL    Creatinine 1.38 (H) 0.67 - 1.17 mg/dL    GFR Estimate 60 (L) >60 mL/min/1.73m2    Calcium 9.3 8.6 - 10.0 mg/dL    Glucose 158 (H) 70 - 99 mg/dL   CBC with platelets and differential   Result Value Ref Range    WBC Count 9.7 4.0 - 11.0 10e3/uL    RBC Count 4.57 4.40 - 5.90 10e6/uL    Hemoglobin 14.1 13.3 - 17.7 g/dL    Hematocrit 42.7 40.0 - 53.0 %    MCV 93 78 - 100 fL    MCH 30.9 26.5 - 33.0 pg    MCHC 33.0 31.5 - 36.5 g/dL    RDW 13.4 10.0 - 15.0 %    Platelet Count 264 150 - 450 10e3/uL    % Neutrophils 69 %    % Lymphocytes 21 %    % Monocytes 6 %    % Eosinophils 3 %    % Basophils 1 %    % Immature Granulocytes 0 %    NRBCs per 100 WBC 0 <1 /100    Absolute Neutrophils 6.7 1.6 - 8.3 10e3/uL    Absolute Lymphocytes 2.1 0.8 - 5.3 10e3/uL    Absolute Monocytes 0.6 0.0 - 1.3 10e3/uL    Absolute Eosinophils 0.3 0.0 - 0.7 10e3/uL    Absolute Basophils 0.1 0.0 - 0.2 10e3/uL    Absolute Immature Granulocytes 0.0 <=0.4 10e3/uL    Absolute NRBCs 0.0 10e3/uL   CT Abdomen Pelvis w Contrast    Narrative    EXAM: CT ABDOMEN PELVIS W CONTRAST  LOCATION: [facility]  DATE: 4/16/2024    INDICATION: Colostomy bag malfunction, likely contact dermatitis of abdominal wall from contact with adhesive and fecal matter.   COMPARISON: 01/01/2024.  TECHNIQUE: CT scan of the abdomen and pelvis was performed following injection of IV contrast. Multiplanar reformats were obtained. Dose reduction techniques were used.  CONTRAST: 100 mL Isovue 370.    FINDINGS: Mild motion artifact.    LOWER CHEST: Mild basilar atelectasis.    HEPATOBILIARY: Unremarkable.    PANCREAS: Normal.    SPLEEN: Normal.    ADRENAL GLANDS: Stable 3.8 cm partially calcified right adrenal mass.    KIDNEYS/BLADDER: Appropriately positioned left ureteral stent. Mild to  moderate left hydroureteronephrosis. Prior left UPJ stone is now along the dependent renal pelvis, measuring 4 x 10 mm. Prior distal left ureteral stone is not seen. Mildly   heterogeneous left renal enhancement and mild asymmetric left perinephric stranding. Minimal left renal pelvis and ureteral wall thickening.     Severely distended urinary bladder with mild wall thickening.     Negative right kidney.    BOWEL: Colectomy changes. Right lower quadrant ileostomy. No bowel obstruction or wall thickening.    LYMPH NODES: Stable upper normal left retroperitoneal nodes.    VASCULATURE: Nonaneurysmal aorta. Minimal atherosclerosis.    PELVIC ORGANS: Nothing acute.    MUSCULOSKELETAL: Degenerative changes spine.      Impression    IMPRESSION:     1.  Left ureteral stent is appropriately position, though mild - moderate left hydroureteronephrosis is present. Correlate for stent malfunction.    2.  Prior left UPJ stone is now along the dependent left renal pelvis and nonobstructing.    3.  Mildly heterogeneous left renal enhancement; pyelonephritis within the differential. Additional severely distended urinary bladder with mild circumferential wall thickening. Correlate with urinary labs.    4.  No free fluid or air. No abscess.         Medications   iopamidol (ISOVUE-370) solution 100 mL (100 mLs Intravenous $Given 4/16/24 2014)   sodium chloride 0.9 % bag 500mL for CT scan flush use (72 mLs Intravenous $Given 4/16/24 2014)       Assessments & Plan (with Medical Decision Making)   54 year old male with past medical history notable for schizophrenia status post colostomy, resides from group home who was brought in by ambulance for problems with ostomy detailed in HPI. Patient was seen in outside ED for similar yesterday. Was given new bag but did not fit. No mention in note of erythema of abdominal wall. Pt is afebrile. Normal lytes, Cr slightly elevated to 1.38, was 1.19 on 2/13/24. CT abdomen pelvis shows appropriately  positioned left ureteral stent. Prior left UPJ stone now in dependent bladder. Bladder is distended but is passing urine. Bladder wall thickening. Patient is unaware of ureteral stent placement. I do not see this in Epic. Will need to f/up in this with PCP and possibly . No infectious symptoms, no fever, no leukocytosis. Erythema on abdominal wall most likely a contact dermatitis. This is supported by shape of erythema and that this correlates to duct tape on abdominal wall and fecal matter on skin. Area cleansed, dried and barrier cream applied. Okay to go back to group home. New bag of appropriate size was placed and given replacement bags.         I have reviewed the nursing notes.         New Prescriptions    No medications on file       Final diagnoses:   Colostomy malfunction (H)   Irritant contact dermatitis due to fecal incontinence     Odilon Sandra MD        4/16/2024   [facility] EMERGENCY DEPT    Disclaimer: This note consists of words and symbols derived from keyboarding and dictation using voice recognition software.  As a result, there may be errors that have gone undetected.  Please consider this when interpreting information found in this note.               Odilon Sandra MD  04/17/24 [id number]

## 2024-04-18 ENCOUNTER — HOSPITAL ENCOUNTER (EMERGENCY)
Facility: CLINIC | Age: 54
Discharge: HOME | End: 2024-04-18
Attending: STUDENT IN AN ORGANIZED HEALTH CARE EDUCATION/TRAINING PROGRAM | Admitting: STUDENT IN AN ORGANIZED HEALTH CARE EDUCATION/TRAINING PROGRAM
Payer: COMMERCIAL

## 2024-04-18 DIAGNOSIS — K94.03 COLOSTOMY MALFUNCTION (H): ICD-10-CM

## 2024-04-18 PROCEDURE — 99283 EMERGENCY DEPT VISIT LOW MDM: CPT | Performed by: STUDENT IN AN ORGANIZED HEALTH CARE EDUCATION/TRAINING PROGRAM

## 2024-04-18 ASSESSMENT — ACTIVITIES OF DAILY LIVING (ADL): ADLS_ACUITY_SCORE: 35

## 2024-04-18 NOTE — ED NOTES
Cleaned around the ostomy site and used cavilon for skin protectant.     1 3/4 inch hole made in the bag placed on the abdomen wall.     The skin just around the ostomy site is clean,intact and slightly red. Below the site of the bag is a red skin that is obviously angry.

## 2024-04-18 NOTE — ED PROVIDER NOTES
History     Chief Complaint   Patient presents with    colostomy malfunction     HPI  Jayden Cevallos is a 54 year old male who has schizophrenia, status post colostomy, resides in a group home, who was brought in by EMS due to issues with colostomy supplies.  Patient states that he is out of ostomy supplies.  He states that when the bag fills up he takes it off and throws it away and then had been attaching the new bags with duct tape.  After throwing the most recent bag away, he had no further supplies, so he was brought to the ER.  He denies any abdominal pain.  Denies fever.  He has no other complaints.  Nursing put on a new colostomy bag prior to my evaluation.    Patient has had 2 recent ER visits for similar things in the last couple of days.  He was seen at [facility] on 4/15 and then seen at this hospital on 4/16.  He was noted to have contact dermatitis due to using duct tape on his abdomen.  Labs were reassuring and he also had a CT of the abdomen and pelvis that was without any acute findings.    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) 02/13/2024     Priority: Medium    Class 2 severe obesity due to excess calories with serious comorbidity in adult (H) 02/13/2024     Priority: Medium    Schizophrenia (H) 02/13/2024     Priority: Medium    Hyperlipidemia LDL goal <100 02/13/2024     Priority: Medium    Tobacco abuse 02/13/2024     Priority: Medium    Status post ileostomy (H) 02/13/2024     Priority: Medium    Acute kidney injury (H24) 07/31/2022     Priority: Medium    Hyperkalemia 11/19/2019     Priority:  Medium    Elevated bilirubin 11/19/2019     Priority: Medium    Small bowel obstruction (H) 11/18/2019     Priority: Medium    Drug-induced akathisia 05/22/2019     Priority: Medium    Atelectasis 12/18/2018     Priority: Medium    Adrenal mass, right (H24) 06/03/2016     Priority: Medium    Partial obstruction of small intestine (H) 06/03/2016     Priority: Medium    Stage 3 chronic kidney disease (H) 05/18/2015     Priority: Medium     Formatting of this note might be different from the original.   Updated per 10/1/17 IMO import  Formatting of this note might be different from the original.   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)     Hyperlipidemia LDL goal <100     Schizophrenia (H)     Tobacco abuse        Past Surgical History:    Past Surgical History:   Procedure Laterality Date    osteomy  2016       Family History:    Family History   Problem Relation Age of Onset    Alcoholism Father        Social History:  Marital Status:  Single [1]  Social History     Tobacco Use    Smoking status: Every Day     Current packs/day: 0.50     Types: Cigarettes     Passive exposure: Current    Smokeless tobacco: Never    Tobacco comments:     Prior drinker in 80 and 90s   Vaping Use    Vaping status: Never Used   Substance Use Topics    Drug use: Never        Medications:    acetaminophen (TYLENOL) 325 MG tablet  ASPIRIN LOW DOSE 81 MG EC tablet  atorvastatin (LIPITOR) 40 MG tablet  cloZAPine (CLOZARIL) 200 MG tablet  cloZAPine (CLOZARIL) 50 MG tablet  diphenoxylate-atropine (LOMOTIL) 2.5-0.025 MG tablet  insulin syringe-needle U-100 (29G X  "1/2\" 1 ML) 29G X 1/2\" 1 ML miscellaneous  LANTUS SOLOSTAR 100 UNIT/ML soln  loperamide (IMODIUM) 2 MG capsule  MAGNESIUM OXIDE 400 (240 Mg) MG tablet  metoprolol tartrate (LOPRESSOR) 50 MG tablet  octreotide (SANDOSTATIN) 100 MCG/ML SOLN injection  omeprazole (PRILOSEC) 20 MG DR capsule  risperiDONE (RISPERDAL) 2 MG tablet  sodium bicarbonate 650 MG tablet  venlafaxine (EFFEXOR XR) 75 MG 24 hr capsule  Vitamin D, Cholecalciferol, 25 MCG (1000 UT) CAPS          Review of Systems  See HPI  Physical Exam   BP: (!) (P) 140/91  Pulse: (P) 75  Temp: (P) 98.6  F (37  C)  Resp: (P) 20  Weight: (P) 117.9 kg (260 lb)  SpO2: (P) 99 %      Physical Exam  BP (!) (P) 140/91   Pulse (P) 75   Temp (P) 98.6  F (37  C) (Oral)   Resp (P) 20   Wt (P) 117.9 kg (260 lb)   SpO2 (P) 99%   BMI (P) 38.95 kg/m    General: alert, interactive, in no apparent distress  Head: atraumatic  Nose: no rhinorrhea or epistaxis  Ears: no external auditory canal discharge or bleeding.    Eyes: Sclera nonicteric. Conjunctiva noninjected. PERRL, EOMI  Mouth: no tonsillar erythema, edema, or exudate.  Moist mucous membranes  Neck: supple, moving spontaneously no midline cervical tenderness  Lungs: No increased work of breathing.  Clear to auscultation bilaterally.  CV: RRR, peripheral pulses palpable and symmetric  Abdomen: soft, nt, nd, no guarding or rebound.  Ostomy bag in place draining brownish-green stool material.  There is evidence of dermatitis, but no evidence of active infection.  Actually appears improved from recent picture in the chart.  Extremities: Warm and well-perfused.   Skin: no rash or diaphoresis  Neuro: CN II-XII grossly intact, strength 5/5 in UE and LEs bilaterally, sensation intact to light touch in UE and LEs bilaterally;     ED Course        Procedures             Critical Care time:  none             No results found for this or any previous visit (from the past 24 hour(s)).    Medications - No data to " display    Assessments & Plan (with Medical Decision Making)     I have reviewed the nursing notes.    I have reviewed the findings, diagnosis, plan and need for follow up with the patient.          Medical Decision Making  Jayden Cevallos is a 54 year old male who has schizophrenia, status post colostomy, resides in a group home, who was brought in by EMS due to issues with colostomy supplies.  Vital signs reviewed and notable for hypertension, otherwise reassuring.  Patient had a new colostomy bag placed by nursing.  Appears to be working appropriately.  Patient does have evidence of contact dermatitis secondary to using duct tape, but when compared to the recent picture, does appear to be improving.  No signs of infection.  He has benign abdominal exam.  He has no other complaints.  Nursing and I stressed the importance and reiterated numerous times that the patient cannot just throw his bag away when it gets full.  He needs to empty it.  This was also discussed with the group home.  Additional supplies will be given to the group home directly so they can help manage this.  All questions answered.  Patient discharged in stable condition.        New Prescriptions    No medications on file       Final diagnoses:   Colostomy malfunction (H)       4/18/2024   [facility] EMERGENCY DEPT       Charli Chau MD  04/18/24 [id number]

## 2024-04-18 NOTE — DISCHARGE INSTRUCTIONS
It is very important that you do not remove the colostomy bag when it is full.  You should empty it as the nurses showed you.  Make sure that you ask for help if needed.  Follow-up with your regular doctor.

## 2024-04-19 ENCOUNTER — TELEPHONE (OUTPATIENT)
Dept: FAMILY MEDICINE | Facility: CLINIC | Age: 54
End: 2024-04-19
Payer: COMMERCIAL

## 2024-04-19 NOTE — TELEPHONE ENCOUNTER
Forms received from: [employer]    Phone number listed: [phone]   Fax listed: [phone]  Date received: 4/19/24  Form description: ICD-10 diagnosis verification form  Once forms are completed, please return to [employer]  via fax.  Is patient requesting to be contacted when forms are completed: na  Phone: na  Form placed:  Dr. Kelin Hope

## 2024-04-24 DIAGNOSIS — Z90.49 HISTORY OF COLON RESECTION: ICD-10-CM

## 2024-04-24 RX ORDER — LANOLIN ALCOHOL/MO/W.PET/CERES
400 CREAM (GRAM) TOPICAL 3 TIMES DAILY
Qty: 270 TABLET | Refills: 3 | OUTPATIENT
Start: 2024-04-24

## 2024-05-13 ENCOUNTER — TELEPHONE (OUTPATIENT)
Dept: FAMILY MEDICINE | Facility: CLINIC | Age: 54
End: 2024-05-13
Payer: COMMERCIAL

## 2024-05-13 DIAGNOSIS — E11.8 DIABETES MELLITUS TYPE 2 WITH COMPLICATIONS (H): Primary | ICD-10-CM

## 2024-05-13 NOTE — TELEPHONE ENCOUNTER
"I called [id number] - [place], [place] - [address] [facility] [address].     According to Hailey, patient transferred out previous prescription for insulin syringe-needle U-100 (29G X 1/2\" 1 ML) on 2/22/24.     This prescription was written on 2/13/24 for 100 each/3 refills.     The above pharmacy does not have the 29G x 1/2\" in stock.   If patient wants to fill with them, they have the 32G x 4 mm.     Patient contacted and he does need a refill on his needles & prefers to use the above pharmacy.     Updated prescription sent.     Arline Kee RN BSN  [facility]     "

## 2024-05-14 ENCOUNTER — TELEPHONE (OUTPATIENT)
Dept: WOUND CARE | Facility: CLINIC | Age: 54
End: 2024-05-14
Payer: COMMERCIAL

## 2024-05-14 NOTE — TELEPHONE ENCOUNTER
Spoke with .  Patient had frequent leaking from ileostomy in the recent past but is requesting to have an ileostomy takedown.  Patient has a referral for colorectal team and phone number for that department was given.  Patient had frequent leaking and was seen in the emergency room  due to this on April 16.  The photo in the chart shows significant skin breakdown.  Manager says that he does not have leaking currently and asked me to speak with the patient who also denies that he has problems with pouching.  The leaking he had previously was significant and I will see him still to make sure that he is using the correct products.    ----- Message from Chary Dos Santos RN sent at 5/14/2024  9:14 AM CDT -----  Spoke with [facility] [employer] rep who asked me to call the manager at [phone]  ----- Message -----  From: Chary Dos Santos RN  Sent: 5/13/2024   3:36 PM CDT  To: FREDDY Chaidez with [facility] [employer]  requesting appt for patient [phone]

## 2024-05-15 ENCOUNTER — TELEPHONE (OUTPATIENT)
Dept: FAMILY MEDICINE | Facility: CLINIC | Age: 54
End: 2024-05-15

## 2024-05-15 ENCOUNTER — HOSPITAL ENCOUNTER (EMERGENCY)
Facility: CLINIC | Age: 54
Discharge: HOME | End: 2024-05-15
Attending: FAMILY MEDICINE | Admitting: FAMILY MEDICINE
Payer: COMMERCIAL

## 2024-05-15 VITALS
RESPIRATION RATE: 18 BRPM | SYSTOLIC BLOOD PRESSURE: 137 MMHG | OXYGEN SATURATION: 95 % | DIASTOLIC BLOOD PRESSURE: 97 MMHG | HEART RATE: 91 BPM | TEMPERATURE: 97.6 F

## 2024-05-15 DIAGNOSIS — Z43.3 COLOSTOMY CARE (H): ICD-10-CM

## 2024-05-15 PROCEDURE — 99282 EMERGENCY DEPT VISIT SF MDM: CPT | Performed by: FAMILY MEDICINE

## 2024-05-15 PROCEDURE — 99283 EMERGENCY DEPT VISIT LOW MDM: CPT | Performed by: FAMILY MEDICINE

## 2024-05-15 ASSESSMENT — ACTIVITIES OF DAILY LIVING (ADL)
ADLS_ACUITY_SCORE: 35
ADLS_ACUITY_SCORE: 35

## 2024-05-15 NOTE — DISCHARGE INSTRUCTIONS
RETURN TO THE EMERGENCY ROOM FOR THE FOLLOWING:    At anytime for any concern.    FOLLOW UP:    As needed.    TREATMENT RECOMMENDATIONS:    There have been no new medications provided and there are no prescription medication changes recommended.     NURSE ADVICE LINE:  [phone] or [phone]

## 2024-05-15 NOTE — TELEPHONE ENCOUNTER
Poppy ([facility]) calling, states that they have been having lots of issues with [facility] for patient's Ostomy supplies. Notes that they keep having to call and things are never sent out on time, resulting in patient needing to go to ED for care and supplies.    Poppy states that [facility] is to be sending supplies at the end of this week, then is asking that ostomy supply order please be sent to [facility] to take over supplies.    [facility]:  Fax#: [phone]    Please call home care [facility] [phone], dvm ok    Thanks,  FARIHA Buenrostro RN  [facility]

## 2024-05-15 NOTE — ED PROVIDER NOTES
HPI   Patient is a 54-year-old male presenting with colostomy issues.  He is known to have a history of megacolon with subsequent colostomy and ileostomy.  The patient has presented to the ED multiple times for similar (he throws away his ostomy bag whenever it fills and then he runs out of supplies).  This time he tells us that he awoke overnight and had torn off the bag accidentally.  No other concerns are raised.      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) 02/13/2024     Priority: Medium    Class 2 severe obesity due to excess calories with serious comorbidity in adult (H) 02/13/2024     Priority: Medium    Schizophrenia (H) 02/13/2024     Priority: Medium    Hyperlipidemia LDL goal <100 02/13/2024     Priority: Medium    Tobacco abuse 02/13/2024     Priority: Medium    Status post ileostomy (H) 02/13/2024     Priority: Medium    Acute kidney injury (H24) 07/31/2022     Priority: Medium    Hyperkalemia 11/19/2019     Priority: Medium    Elevated bilirubin 11/19/2019     Priority: Medium    Small bowel obstruction (H) 11/18/2019     Priority: Medium    Drug-induced akathisia 05/22/2019     Priority: Medium    Atelectasis 12/18/2018     Priority: Medium    Adrenal mass, right (H24) 06/03/2016     Priority: Medium    Partial obstruction of small intestine (H) 06/03/2016     Priority: Medium    Stage 3 chronic kidney disease (H) 05/18/2015     Priority: Medium     Formatting of this note might be different from the original.   Updated per 10/1/17 IMO import  Formatting of this note might be different from the original.    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)     Hyperlipidemia LDL goal <100     Schizophrenia (H)     Tobacco abuse      Past Surgical History:    Past Surgical History:   Procedure Laterality Date    osteomy  2016     Family History:    Family History   Problem Relation Age of Onset    Alcoholism Father      Social History:  Marital Status:  Single [1]  Social History     Tobacco Use    Smoking status: Every Day     Current packs/day: 0.50     Types: Cigarettes     Passive exposure: Current    Smokeless tobacco: Never    Tobacco comments:     Prior drinker in 80 and 90s   Vaping Use    Vaping status: Never Used   Substance Use Topics    Drug use: Never      Medications:    acetaminophen (TYLENOL) 325 MG tablet  ASPIRIN LOW DOSE 81 MG EC tablet  atorvastatin (LIPITOR) 40 MG tablet  cloZAPine (CLOZARIL) 200 MG tablet  cloZAPine (CLOZARIL) 50 MG tablet  diphenoxylate-atropine (LOMOTIL) 2.5-0.025 MG tablet  insulin pen needle (32G X 4 MM) 32G X 4 MM miscellaneous  LANTUS SOLOSTAR 100 UNIT/ML soln  loperamide (IMODIUM) 2 MG capsule  MAGNESIUM OXIDE 400 (240 Mg) MG tablet  metoprolol tartrate (LOPRESSOR) 50 MG tablet  octreotide (SANDOSTATIN) 100 MCG/ML SOLN injection  omeprazole (PRILOSEC) 20 MG DR capsule  risperiDONE (RISPERDAL) 2 MG tablet  sodium bicarbonate 650 MG tablet  venlafaxine (EFFEXOR XR) 75 MG 24 hr capsule  Vitamin D, Cholecalciferol, 25 MCG (1000 UT) CAPS      Review of Systems   All other systems reviewed and are negative.      PE   BP: (!) 137/97  Pulse: 91  Temp: 97.6  F (36.4  C)  Resp: 18  SpO2: 95 %  Physical Exam  Vitals  and nursing note reviewed.   Constitutional:       General: He is not in acute distress.  HENT:      Head: Atraumatic.      Right Ear: External ear normal.      Left Ear: External ear normal.      Nose: Nose normal.      Mouth/Throat:      Mouth: Mucous membranes are moist.      Pharynx: Oropharynx is clear.   Eyes:      General: No scleral icterus.     Extraocular Movements: Extraocular movements intact.      Conjunctiva/sclera: Conjunctivae normal.      Pupils: Pupils are equal, round, and reactive to light.   Cardiovascular:      Rate and Rhythm: Normal rate.   Pulmonary:      Effort: Pulmonary effort is normal. No respiratory distress.   Abdominal:      Palpations: Abdomen is soft.      Tenderness: There is no abdominal tenderness.   Musculoskeletal:         General: Normal range of motion.      Cervical back: Normal range of motion.   Skin:     General: Skin is warm and dry.   Neurological:      Mental Status: He is alert and oriented to person, place, and time.   Psychiatric:         Behavior: Behavior normal.         ED COURSE and MDM   Ostomy bag changed and two bags given to the patient for home.    Electronic medical chart reviewed, including medical problems, medications, medical allergies, social history.  Recent hospitalizations and surgical procedures reviewed.  Recent clinic visits and consultations reviewed.  Recent labs and test results reviewed.  Nursing notes reviewed.    The patient, their parent if applicable, and/or their medical decision maker(s) and I have reviewed all of the available historical information, applicable PMH, physical exam findings, and objective diagnostic data gathered during this ED visit.  We then discussed all work-up options and then together agreed upon the course taken during this visit.  The ultimate disposition and plan was a cooperative decision made between myself and the patient, their parent if applicable, and/or their legal decision maker(s).  The risks and  benefits of all decisions made during this visit were discussed to the best of my abilities given the circumstances, and all parties are understanding of the pertinent ramifications of these decisions.      LABS  Labs Ordered and Resulted from Time of ED Arrival to Time of ED Departure - No data to display    IMAGING  Images reviewed by me.  Radiology report also reviewed.  No orders to display       Procedures    Medications - No data to display      IMPRESSION       ICD-10-CM    1. Colostomy care (H)  Z43.3              Medication List      There are no discharge medications for this visit.                             Mustapha Puente MD  05/15/24 0613

## 2024-05-20 ENCOUNTER — LAB (OUTPATIENT)
Dept: LAB | Facility: CLINIC | Age: 54
End: 2024-05-20
Payer: COMMERCIAL

## 2024-05-20 DIAGNOSIS — F25.9 SCHIZOAFFECTIVE DISORDER, SUBCHRONIC CONDITION (H): Primary | ICD-10-CM

## 2024-05-20 LAB
BASOPHILS # BLD AUTO: 0.1 10E3/UL (ref 0–0.2)
BASOPHILS NFR BLD AUTO: 1 %
EOSINOPHIL # BLD AUTO: 0.1 10E3/UL (ref 0–0.7)
EOSINOPHIL NFR BLD AUTO: 2 %
ERYTHROCYTE [DISTWIDTH] IN BLOOD BY AUTOMATED COUNT: 12.9 % (ref 10–15)
HCT VFR BLD AUTO: 46.1 % (ref 40–53)
HGB BLD-MCNC: 14.7 G/DL (ref 13.3–17.7)
HOLD SPECIMEN: NORMAL
IMM GRANULOCYTES # BLD: 0 10E3/UL
IMM GRANULOCYTES NFR BLD: 0 %
LYMPHOCYTES # BLD AUTO: 1.4 10E3/UL (ref 0.8–5.3)
LYMPHOCYTES NFR BLD AUTO: 19 %
MCH RBC QN AUTO: 30.5 PG (ref 26.5–33)
MCHC RBC AUTO-ENTMCNC: 31.9 G/DL (ref 31.5–36.5)
MCV RBC AUTO: 96 FL (ref 78–100)
MONOCYTES # BLD AUTO: 0.4 10E3/UL (ref 0–1.3)
MONOCYTES NFR BLD AUTO: 5 %
NEUTROPHILS # BLD AUTO: 5.3 10E3/UL (ref 1.6–8.3)
NEUTROPHILS NFR BLD AUTO: 73 %
PLATELET # BLD AUTO: 291 10E3/UL (ref 150–450)
RBC # BLD AUTO: 4.82 10E6/UL (ref 4.4–5.9)
WBC # BLD AUTO: 7.2 10E3/UL (ref 4–11)

## 2024-05-20 PROCEDURE — 36415 COLL VENOUS BLD VENIPUNCTURE: CPT

## 2024-05-20 PROCEDURE — 85025 COMPLETE CBC W/AUTO DIFF WBC: CPT

## 2024-05-24 DIAGNOSIS — Z90.49 HISTORY OF COLON RESECTION: ICD-10-CM

## 2024-05-24 RX ORDER — MAGNESIUM OXIDE TAB 400 MG (241.3 MG ELEMENTAL MG) 400 (241.3 MG) MG
400 TAB ORAL 3 TIMES DAILY
Qty: 90 TABLET | OUTPATIENT
Start: 2024-05-24

## 2024-05-28 ENCOUNTER — HOSPITAL ENCOUNTER (EMERGENCY)
Facility: CLINIC | Age: 54
Discharge: HOME | End: 2024-05-28
Attending: EMERGENCY MEDICINE | Admitting: EMERGENCY MEDICINE
Payer: COMMERCIAL

## 2024-05-28 VITALS
TEMPERATURE: 98.2 F | OXYGEN SATURATION: 96 % | DIASTOLIC BLOOD PRESSURE: 79 MMHG | RESPIRATION RATE: 16 BRPM | SYSTOLIC BLOOD PRESSURE: 151 MMHG | HEART RATE: 85 BPM

## 2024-05-28 DIAGNOSIS — Z71.89 ENCOUNTER FOR OSTOMY CARE EDUCATION: ICD-10-CM

## 2024-05-28 DIAGNOSIS — Z93.2 STATUS POST ILEOSTOMY (H): Primary | ICD-10-CM

## 2024-05-28 LAB
BASOPHILS # BLD AUTO: 0.1 10E3/UL (ref 0–0.2)
BASOPHILS NFR BLD AUTO: 1 %
EOSINOPHIL # BLD AUTO: 0.2 10E3/UL (ref 0–0.7)
EOSINOPHIL NFR BLD AUTO: 2 %
ERYTHROCYTE [DISTWIDTH] IN BLOOD BY AUTOMATED COUNT: 13.3 % (ref 10–15)
HCT VFR BLD AUTO: 42.5 % (ref 40–53)
HGB BLD-MCNC: 13.9 G/DL (ref 13.3–17.7)
IMM GRANULOCYTES # BLD: 0 10E3/UL
IMM GRANULOCYTES NFR BLD: 0 %
LYMPHOCYTES # BLD AUTO: 2.3 10E3/UL (ref 0.8–5.3)
LYMPHOCYTES NFR BLD AUTO: 30 %
MCH RBC QN AUTO: 31 PG (ref 26.5–33)
MCHC RBC AUTO-ENTMCNC: 32.7 G/DL (ref 31.5–36.5)
MCV RBC AUTO: 95 FL (ref 78–100)
MONOCYTES # BLD AUTO: 0.5 10E3/UL (ref 0–1.3)
MONOCYTES NFR BLD AUTO: 7 %
NEUTROPHILS # BLD AUTO: 4.5 10E3/UL (ref 1.6–8.3)
NEUTROPHILS NFR BLD AUTO: 60 %
NRBC # BLD AUTO: 0 10E3/UL
NRBC BLD AUTO-RTO: 0 /100
PLATELET # BLD AUTO: 266 10E3/UL (ref 150–450)
RBC # BLD AUTO: 4.49 10E6/UL (ref 4.4–5.9)
WBC # BLD AUTO: 7.6 10E3/UL (ref 4–11)

## 2024-05-28 PROCEDURE — 85041 AUTOMATED RBC COUNT: CPT | Performed by: EMERGENCY MEDICINE

## 2024-05-28 PROCEDURE — 99283 EMERGENCY DEPT VISIT LOW MDM: CPT | Performed by: EMERGENCY MEDICINE

## 2024-05-28 PROCEDURE — 36415 COLL VENOUS BLD VENIPUNCTURE: CPT | Performed by: EMERGENCY MEDICINE

## 2024-05-28 PROCEDURE — G0463 HOSPITAL OUTPT CLINIC VISIT: HCPCS

## 2024-05-28 RX ORDER — PANTOPRAZOLE SODIUM 40 MG/1
40 TABLET, DELAYED RELEASE ORAL DAILY
Qty: 90 TABLET | Refills: 1 | Status: SHIPPED | OUTPATIENT
Start: 2024-05-28

## 2024-05-28 ASSESSMENT — COLUMBIA-SUICIDE SEVERITY RATING SCALE - C-SSRS
6. HAVE YOU EVER DONE ANYTHING, STARTED TO DO ANYTHING, OR PREPARED TO DO ANYTHING TO END YOUR LIFE?: NO
1. IN THE PAST MONTH, HAVE YOU WISHED YOU WERE DEAD OR WISHED YOU COULD GO TO SLEEP AND NOT WAKE UP?: NO
2. HAVE YOU ACTUALLY HAD ANY THOUGHTS OF KILLING YOURSELF IN THE PAST MONTH?: NO

## 2024-05-28 ASSESSMENT — ACTIVITIES OF DAILY LIVING (ADL)
ADLS_ACUITY_SCORE: 33
ADLS_ACUITY_SCORE: 33

## 2024-05-28 NOTE — DISCHARGE INSTRUCTIONS
Follow-up with your primary clinic.     Return to the emergency department for any problems.    You may need a different pouch to prevent leakage. Today we used:  [place] cut to fit barrier # 18390- cut to 35mm opening  Ara pouch # [id number]  Snap the two pieces together prior to applying the pouch then just apply as you would a one piece    We also applied an abdominal binder and cut a hole in it to pull the  pouch though to prevent you from pulling at the tape on the pouch in your sleep.     We recommend you have the group home staff help with all pouch changes, change 2-3 times per week and when needed for leakage. To change:  1.) Cleanse skin with warm water and dry well  2.) Apply powder to any open skin then brush away excess  3.) Dab over the powder with 3M cavilon no-sting barrier and allow to dry  4.) Apply pouch and press well into stomach to seal  5.) Hold warm hand over for about 5 minutes to seal it    There is not a precut option in this pouch or in a one piece but can get a cut to fit one piece- #4766    You can call another company - [employer], # [phone] and ask for a sample of a precut 35mm opening in a light convexity and they can send a sample for you to try. Would recommend seeing the ostomy nurse in the clinic with this sample when it comes in, we could try it in the clinic as it as a different opening you will need to learn to use. A referral order has been placed, you would need to call to schedule an appointment with an ostomy nurse, [phone].    Namita Pemberton RN, CWOCN

## 2024-05-28 NOTE — CONSULTS
[facility]  OUTPATIENT OSTOMY ASSESSMENT    INTAKE  Type of Stoma: Permanent Ileostomy  Anticipated date of takedown: n/a    Diagnosis Pertinent to Stoma:  Megacolon    Type of Surgery:  unknown    Surgery Date: unknown, years ago  Surgeon:unknown     Hospital:  [facility] -ED visit    Purpose of this visit: Leaking Problem and supply issue, seen in ED. Pt with frequent ED visits for supply issues, unclear if this is due to pt removing pouches even when not leaking (several times daily,) or if leakage may also be a reason    Pertinent Information: Lives in group home - he is reportedly independent with his care but staff here with him today states he thinks pt is not doing a good job applying his pouch and can go through several just trying to get on, he is not doing well with cutting the opening. They have used  [facility] [employer] for supplies but are changing to [facility] now, working with his clinic on this. He is only able to get 20 pouches per month (this is standard)    Present for Teaching Session: Patient and staff member   Present: NA      Current Equipment:   Unknown- sounds like may be a flat cut to fit one piece Ara with a clamp, and a barrier ring      Pouch Change Frequency: see above  Provider of Care: Emptying: pt Pouch Change: pt    ASSESSMENT          Stoma Size: Round 1 3/8 inches  Protrusion:  minimal - with peristalsis near flush  Stoma Appearance: Red, Moist, and white patches on stoma  Mucocutaneous Juncture: Intact   Peristomal Skin: Erythema- some denudement  Abdominal Assessment:  firm, rounded, some creasing distally when bending at abd    TREATMENT  Applied Stoma Powder and Applied No Sting Skin barrier    INTERVENTIONS  Refitting done- recommend trial of soft convexity- use Pierre Part #71929 cut to 1 3/8 with pouch #34954 NO BARRIER RING- this looked like a very good fit    INSTRUCTIONS GIVEN  See plan, this was dicussed in person and  provided in writing. Advised staff need to help with pouch changes.    PLAN  You may need a different pouch to prevent leakage. Today we used:  [place] cut to fit barrier # 80816- cut to 35mm opening  Ara pouch # 11478  Snap the two pieces together prior to applying the pouch then just apply as you would a one piece    We also applied an abdominal binder and cut a hole in it to pull the  pouch though to prevent you from pulling at the tape on the pouch in your sleep.     We recommend you have the group home staff help with all pouch changes, change 2-3 times per week and when needed for leakage. To change:  1.) Cleanse skin with warm water and dry well  2.) Apply powder to any open skin then brush away excess  3.) Dab over the powder with 3M cavilon no-sting barrier and allow to dry  4.) Apply pouch and press well into stomach to seal  5.) Hold warm hand over for about 5 minutes to seal it    There is not a precut option in this pouch or in a one piece but can get a cut to fit one piece- #1101    You can call another company - [employer], # [phone] and ask for a sample of a precut 35mm opening in a light convexity and they can send a sample for you to try. Would recommend seeing the ostomy nurse in the clinic with this sample when it comes in, we could try it in the clinic as it as a different opening you will need to learn to use. A referral order has been placed, you would need to call to schedule an appointment with an ostomy nurse, [phone].    Provided 3 more barriers and pouches and advised they go in person to [facility] if needing more supplies rather than coming to ED. Advised they ask provider to write a letter of medical necessity if needing more than 20 per month.    Total Time Spent with Patient: 30 minutes    Namita Pemberton RN, CWOCN

## 2024-05-28 NOTE — ED TRIAGE NOTES
Pt lives at [facility], came in with staff for ostomy bag change. Per staff they had a supply change, and has not received products yet. Staff also reported pt needed labs drawn, seeing if CBC diff can be drawn today.      Triage Assessment (Adult)       Row Name 05/28/24 1121          Triage Assessment    Airway WDL WDL        Respiratory WDL    Respiratory WDL WDL        Skin Circulation/Temperature WDL    Skin Circulation/Temperature WDL WDL        Cardiac WDL    Cardiac WDL WDL        Peripheral/Neurovascular WDL    Peripheral Neurovascular WDL WDL        Cognitive/Neuro/Behavioral WDL    Cognitive/Neuro/Behavioral WDL WDL

## 2024-05-29 ENCOUNTER — DOCUMENTATION ONLY (OUTPATIENT)
Dept: VASCULAR SURGERY | Facility: CLINIC | Age: 54
End: 2024-05-29
Payer: COMMERCIAL

## 2024-05-29 NOTE — PROGRESS NOTES
Vascular Referral Intake    Referred by: [facility] for leaking ileostomy    Specialty: ostomy clinic- [place]    Specific Provider if Necessary:  [facility]    Visit Type: ostomy clinic    Time Frame: Next Available    Testing/Imaging Needed Before Consult: none    Appt Note: (to be pasted into appt comments, also add where additional information is located, ie, outside images pushed to PACS, in Epic, sent to HIMS, etc)   ** Please include this note in appt notes** leaking ileostomy. Seen in [place] ED on 5/28. Pt would benefit from [employer] flip pouch

## 2024-05-30 NOTE — ED PROVIDER NOTES
ED Provider Note  [facility]      History     Chief Complaint   Patient presents with    Ostomy bag change     HPI  Jayden Cevallos is a 54 year old male who presents to the emergency department complaints that his ostomy bag fell off and there are no supplies to the facility where he is living.  He is also requesting to have a CBC drawn to be sent to his pharmacy where he fills his prescription for Clozaril.  He has no other complaints.    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)     Hyperlipidemia LDL goal <100     Schizophrenia (H)     Tobacco abuse      Past Surgical History:   Procedure Laterality Date    osteomy  2016     acetaminophen (TYLENOL) 325 MG tablet  ASPIRIN LOW DOSE 81 MG EC tablet  atorvastatin (LIPITOR) 40 MG tablet  cloZAPine (CLOZARIL) 200 MG tablet  cloZAPine (CLOZARIL) 50 MG tablet  diphenoxylate-atropine (LOMOTIL) 2.5-0.025 MG tablet  insulin pen needle (32G X 4 MM) 32G X 4 MM miscellaneous  LANTUS SOLOSTAR 100 UNIT/ML soln  loperamide (IMODIUM) 2 MG capsule  MAGNESIUM OXIDE 400 (240 Mg) MG tablet  metoprolol tartrate (LOPRESSOR) 50 MG tablet  octreotide (SANDOSTATIN) 100 MCG/ML SOLN injection  omeprazole (PRILOSEC) 20 MG DR capsule  pantoprazole (PROTONIX) 40 MG EC tablet  risperiDONE (RISPERDAL) 2 MG tablet  sodium bicarbonate 650 MG tablet  venlafaxine (EFFEXOR XR) 75 MG 24 hr capsule  Vitamin D, Cholecalciferol, 25 MCG (1000 UT) CAPS      Allergies   Allergen Reactions    Haloperidol Unknown and Other (See Comments)     unknown    Penicillins Unknown and Other (See Comments)     unknown    Tolerated cephalexin, cefadroxil, ceftriaxone, cefepime    Potassium      Family History  Family History   Problem Relation Age of Onset    Alcoholism Father      Social History   Social History     Tobacco Use    Smoking status: Every Day     Current packs/day: 0.50     Types: Cigarettes     Passive  exposure: Current    Smokeless tobacco: Never    Tobacco comments:     Prior drinker in 80 and 90s   Vaping Use    Vaping status: Never Used   Substance Use Topics    Drug use: Never      Past medical history, past surgical history, medications, allergies, family history, and social history were reviewed with the patient. No additional pertinent items.   A medically appropriate review of systems was performed with pertinent positives and negatives noted in the HPI, and all other systems negative.    Physical Exam   BP: (!) 151/79  Pulse: 85  Temp: 98.2  F (36.8  C)  Resp: 16  SpO2: 96 %  Physical Exam  Vitals and nursing note reviewed.   Constitutional:       General: He is not in acute distress.     Appearance: He is well-developed. He is not ill-appearing or toxic-appearing.   HENT:      Head: Normocephalic and atraumatic.   Eyes:      General: No scleral icterus.     Pupils: Pupils are equal, round, and reactive to light.   Cardiovascular:      Rate and Rhythm: Normal rate.   Pulmonary:      Effort: Pulmonary effort is normal. No respiratory distress.   Musculoskeletal:      Cervical back: Normal range of motion and neck supple.   Skin:     General: Skin is warm and dry.      Coloration: Skin is not pale.      Findings: No rash.   Neurological:      Mental Status: He is alert and oriented to person, place, and time.      Sensory: No sensory deficit.   Psychiatric:         Behavior: Behavior normal.           ED Course, Procedures, & Data      Procedures              Results for orders placed or performed during the hospital encounter of 05/28/24   CBC with platelets and differential     Status: None   Result Value Ref Range    WBC Count 7.6 4.0 - 11.0 10e3/uL    RBC Count 4.49 4.40 - 5.90 10e6/uL    Hemoglobin 13.9 13.3 - 17.7 g/dL    Hematocrit 42.5 40.0 - 53.0 %    MCV 95 78 - 100 fL    MCH 31.0 26.5 - 33.0 pg    MCHC 32.7 31.5 - 36.5 g/dL    RDW 13.3 10.0 - 15.0 %    Platelet Count 266 150 - 450 10e3/uL    %  Neutrophils 60 %    % Lymphocytes 30 %    % Monocytes 7 %    % Eosinophils 2 %    % Basophils 1 %    % Immature Granulocytes 0 %    NRBCs per 100 WBC 0 <1 /100    Absolute Neutrophils 4.5 1.6 - 8.3 10e3/uL    Absolute Lymphocytes 2.3 0.8 - 5.3 10e3/uL    Absolute Monocytes 0.5 0.0 - 1.3 10e3/uL    Absolute Eosinophils 0.2 0.0 - 0.7 10e3/uL    Absolute Basophils 0.1 0.0 - 0.2 10e3/uL    Absolute Immature Granulocytes 0.0 <=0.4 10e3/uL    Absolute NRBCs 0.0 10e3/uL   CBC with platelets differential     Status: None    Narrative    The following orders were created for panel order CBC with platelets differential.  Procedure                               Abnormality         Status                     ---------                               -----------         ------                     CBC with platelets and d...[[id number]]                      Final result                 Please view results for these tests on the individual orders.     Medications - No data to display  Labs Ordered and Resulted from Time of ED Arrival to Time of ED Departure   CBC WITH PLATELETS AND DIFFERENTIAL       Result Value    WBC Count 7.6      RBC Count 4.49      Hemoglobin 13.9      Hematocrit 42.5      MCV 95      MCH 31.0      MCHC 32.7      RDW 13.3      Platelet Count 266      % Neutrophils 60      % Lymphocytes 30      % Monocytes 7      % Eosinophils 2      % Basophils 1      % Immature Granulocytes 0      NRBCs per 100 WBC 0      Absolute Neutrophils 4.5      Absolute Lymphocytes 2.3      Absolute Monocytes 0.5      Absolute Eosinophils 0.2      Absolute Basophils 0.1      Absolute Immature Granulocytes 0.0      Absolute NRBCs 0.0       No orders to display              Assessment & Plan      Ostomy bag was replaced and CBC was sent.  CBC was within normal limits.  Results were faxed to the patient's pharmacy.  He was discharged in good condition.    I have reviewed the nursing notes. I have reviewed the findings, diagnosis, plan and  need for follow up with the patient.    Discharge Medication List as of 5/28/2024  2:03 PM          Final diagnoses:   Encounter for ostomy care education       Andrea Roland MD    [facility] EMERGENCY DEPT  5/28/2024     Andrea Roland MD  05/30/24 7379

## 2024-05-31 ENCOUNTER — TELEPHONE (OUTPATIENT)
Dept: FAMILY MEDICINE | Facility: CLINIC | Age: 54
End: 2024-05-31

## 2024-05-31 PROBLEM — Z93.2 ILEOSTOMY STATUS (H): Status: ACTIVE | Noted: 2024-05-31

## 2024-05-31 PROBLEM — Z93.2 ILEOSTOMY STATUS (H): Status: RESOLVED | Noted: 2024-05-31 | Resolved: 2024-05-31

## 2024-05-31 NOTE — TELEPHONE ENCOUNTER
Forms received from: [employer]      Phone number listed: [phone]   Fax listed: [phone]  Date received: 05/31/2024  Form description: Ostomy Supplies   Once forms are completed, [employer] please return to  via . Fax  Is patient requesting to be contacted when forms are completed:   Phone:   Form placed: Provider Moriah Galindo   Lead    [facility] [place]

## 2024-06-01 ENCOUNTER — HOSPITAL ENCOUNTER (EMERGENCY)
Facility: CLINIC | Age: 54
Discharge: HOME | End: 2024-06-01
Attending: EMERGENCY MEDICINE | Admitting: EMERGENCY MEDICINE
Payer: COMMERCIAL

## 2024-06-01 VITALS — SYSTOLIC BLOOD PRESSURE: 144 MMHG | HEIGHT: 68 IN | DIASTOLIC BLOOD PRESSURE: 82 MMHG | BODY MASS INDEX: 39.78 KG/M2

## 2024-06-01 DIAGNOSIS — K94.03 COLOSTOMY DYSFUNCTION (H): ICD-10-CM

## 2024-06-01 PROCEDURE — 99283 EMERGENCY DEPT VISIT LOW MDM: CPT | Performed by: EMERGENCY MEDICINE

## 2024-06-01 ASSESSMENT — ACTIVITIES OF DAILY LIVING (ADL)
ADLS_ACUITY_SCORE: 35

## 2024-06-01 NOTE — ED PROVIDER NOTES
History     Chief Complaint   Patient presents with    Colostomy leaking     HPI  Jayden Cevallos is a 54 year old male who with a leaking colostomy bag.  The patient has a schizophrenia history as well as a history of bowel obstruction and diabetes type 2.  Says the bag has been falling off so he used electrical tape at his group living home to try to reattach it.  He has no other complaints and otherwise feels well and would like to bag replaced.    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) 02/13/2024     Priority: Medium    Class 2 severe obesity due to excess calories with serious comorbidity in adult (H) 02/13/2024     Priority: Medium    Schizophrenia (H) 02/13/2024     Priority: Medium    Hyperlipidemia LDL goal <100 02/13/2024     Priority: Medium    Tobacco abuse 02/13/2024     Priority: Medium    Status post ileostomy (H) 02/13/2024     Priority: Medium    Acute kidney injury (H24) 07/31/2022     Priority: Medium    Hyperkalemia 11/19/2019     Priority: Medium    Elevated bilirubin 11/19/2019     Priority: Medium    Small bowel obstruction (H) 11/18/2019     Priority: Medium    Drug-induced akathisia 05/22/2019     Priority: Medium    Atelectasis 12/18/2018     Priority: Medium    Adrenal mass, right (H24) 06/03/2016     Priority: Medium    Partial obstruction of small intestine (H) 06/03/2016     Priority: Medium    Stage 3 chronic kidney disease (H) 05/18/2015     Priority: Medium     Formatting of this note might be different from the original.   Updated per 10/1/17 O import  Formatting of this note might be  different from the original.   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)     Hyperlipidemia LDL goal <100     Schizophrenia (H)     Tobacco abuse        Past Surgical History:    Past Surgical History:   Procedure Laterality Date    osteomy  2016       Family History:    Family History   Problem Relation Age of Onset    Alcoholism Father        Social History:  Marital Status:  Single [1]  Social History     Tobacco Use    Smoking status: Every Day     Current packs/day: 0.50     Types: Cigarettes     Passive exposure: Current    Smokeless tobacco: Never    Tobacco comments:     Prior drinker in 80 and 90s   Vaping Use    Vaping status: Never Used   Substance Use Topics    Drug use: Never        Medications:    acetaminophen (TYLENOL) 325 MG tablet  ASPIRIN LOW DOSE 81 MG EC tablet  atorvastatin (LIPITOR) 40 MG tablet  cloZAPine (CLOZARIL) 200 MG tablet  cloZAPine (CLOZARIL) 50 MG tablet  diphenoxylate-atropine (LOMOTIL) 2.5-0.025 MG tablet  insulin pen needle (32G X 4 MM) 32G X 4 MM miscellaneous  LANTUS SOLOSTAR 100 UNIT/ML soln  loperamide (IMODIUM) 2 MG capsule  MAGNESIUM OXIDE 400 (240 Mg) MG tablet  metoprolol tartrate (LOPRESSOR) 50 MG tablet  octreotide (SANDOSTATIN) 100 MCG/ML SOLN injection  omeprazole (PRILOSEC) 20 MG DR capsule  pantoprazole (PROTONIX) 40 MG EC tablet  risperiDONE (RISPERDAL) 2 MG tablet  sodium bicarbonate 650 MG tablet  venlafaxine (EFFEXOR XR) 75 MG 24 hr capsule  Vitamin D, Cholecalciferol, 25 MCG (1000 UT) CAPS          Review of Systems    Physical Exam   BP: (!) 144/82  Pulse: (P) 79  Temp: (P) 98.8  " F (37.1  C)  Resp: (P) 16  Height: 172.7 cm (5' 8\")  SpO2: (P) 95 %      Physical Exam  Constitutional:       General: He is not in acute distress.     Appearance: He is not toxic-appearing.   Abdominal:      Comments: Colostomy bag is in place, partially attached with electrical tape, there is stool oozing from around the tape and the opening of the bag    Abdomen is soft with bowel sounds   Neurological:      Mental Status: He is alert.   Psychiatric:      Comments: Very nice         ED Course     ED Course as of 06/02/24 2017   Sat Jun 01, 2024   1108 He is now placed and not leaking.  The patient has no further medical needs and I am going to discharge him back to his living facility.     Procedures           No results found for this or any previous visit (from the past 24 hour(s)).    Medications - No data to display    Assessments & Plan (with Medical Decision Making)     This patient presents with a dysfunction of the bag of his colostomy apparatus.  Our heroic nurse Alexa Degroot will replace it with a new ostomy bag.     I have reviewed the nursing notes.    I have reviewed the findings, diagnosis, plan and need for follow up with the patient.        Medical Decision Making  The patient's presentation was of low complexity (2+ clearly self-limited or minor problems).    The patient's evaluation involved:  history and exam without other MDM data elements    The patient's management necessitated only low risk treatment.        Discharge Medication List as of 6/1/2024 11:09 AM          Final diagnoses:   Colostomy dysfunction (H)       6/1/2024   [facility] EMERGENCY DEPT       Adolph Olmos MD  06/02/24 2017    "

## 2024-06-01 NOTE — ED TRIAGE NOTES
Pt has a colostomy bag that has been leaking for a few days. Lives in an assisted group home.      Triage Assessment (Adult)       Row Name 06/01/24 0935          Triage Assessment    Airway WDL WDL        Respiratory WDL    Respiratory WDL WDL        Skin Circulation/Temperature WDL    Skin Circulation/Temperature WDL X        Cardiac WDL    Cardiac WDL WDL        Peripheral/Neurovascular WDL    Peripheral Neurovascular WDL WDL        Cognitive/Neuro/Behavioral WDL    Cognitive/Neuro/Behavioral WDL WDL

## 2024-06-01 NOTE — DISCHARGE INSTRUCTIONS
We replaced your colostomy bag, which was leaking.    Please follow up with your regular doctor next week.    Take care and have a nice weekend.

## 2024-06-01 NOTE — ED NOTES
Spoke with pt's . They are expecting supplies to be delivered on Monday. They need a medivan to transport him home.

## 2024-06-01 NOTE — ED NOTES
Bed: ED14  Expected date: 6/1/24  Expected time: 9:25 AM  Means of arrival: Ambulance  Comments:  EMS

## 2024-06-01 NOTE — ED NOTES
Calling pt a medi van ride back to group home. Pt requested to wait in the lobby. Pt in the lobby waiting for transport

## 2024-06-03 DIAGNOSIS — Z90.49 HISTORY OF COLON RESECTION: ICD-10-CM

## 2024-06-04 ENCOUNTER — LAB (OUTPATIENT)
Dept: LAB | Facility: CLINIC | Age: 54
End: 2024-06-04
Payer: COMMERCIAL

## 2024-06-04 DIAGNOSIS — F25.9 SCHIZOAFFECTIVE DISORDER, SUBCHRONIC CONDITION (H): ICD-10-CM

## 2024-06-04 LAB
BASOPHILS # BLD AUTO: 0 10E3/UL (ref 0–0.2)
BASOPHILS NFR BLD AUTO: 1 %
EOSINOPHIL # BLD AUTO: 0.2 10E3/UL (ref 0–0.7)
EOSINOPHIL NFR BLD AUTO: 2 %
ERYTHROCYTE [DISTWIDTH] IN BLOOD BY AUTOMATED COUNT: 13.3 % (ref 10–15)
HCT VFR BLD AUTO: 48.3 % (ref 40–53)
HGB BLD-MCNC: 15.3 G/DL (ref 13.3–17.7)
IMM GRANULOCYTES # BLD: 0 10E3/UL
IMM GRANULOCYTES NFR BLD: 0 %
LYMPHOCYTES # BLD AUTO: 1.5 10E3/UL (ref 0.8–5.3)
LYMPHOCYTES NFR BLD AUTO: 20 %
MCH RBC QN AUTO: 30.4 PG (ref 26.5–33)
MCHC RBC AUTO-ENTMCNC: 31.7 G/DL (ref 31.5–36.5)
MCV RBC AUTO: 96 FL (ref 78–100)
MONOCYTES # BLD AUTO: 0.4 10E3/UL (ref 0–1.3)
MONOCYTES NFR BLD AUTO: 6 %
NEUTROPHILS # BLD AUTO: 5.2 10E3/UL (ref 1.6–8.3)
NEUTROPHILS NFR BLD AUTO: 72 %
PLATELET # BLD AUTO: 307 10E3/UL (ref 150–450)
RBC # BLD AUTO: 5.04 10E6/UL (ref 4.4–5.9)
WBC # BLD AUTO: 7.3 10E3/UL (ref 4–11)

## 2024-06-04 PROCEDURE — 85025 COMPLETE CBC W/AUTO DIFF WBC: CPT

## 2024-06-04 PROCEDURE — 36415 COLL VENOUS BLD VENIPUNCTURE: CPT

## 2024-06-04 RX ORDER — MAGNESIUM OXIDE TAB 400 MG (241.3 MG ELEMENTAL MG) 400 (241.3 MG) MG
400 TAB ORAL 3 TIMES DAILY
Qty: 90 TABLET | OUTPATIENT
Start: 2024-06-04

## 2024-06-04 NOTE — TELEPHONE ENCOUNTER
Freddy calling to follow up on refill request. RN informed Freddy that refill was sent to [facility]. [place] [facility] will call for transfer.     Lor Chery RN on 6/4/2024 at 4:35 PM

## 2024-06-10 ENCOUNTER — LAB (OUTPATIENT)
Dept: LAB | Facility: CLINIC | Age: 54
End: 2024-06-10
Payer: COMMERCIAL

## 2024-06-10 DIAGNOSIS — F25.9 SCHIZOAFFECTIVE DISORDER, SUBCHRONIC CONDITION (H): ICD-10-CM

## 2024-06-10 LAB
BASOPHILS # BLD AUTO: 0.1 10E3/UL (ref 0–0.2)
BASOPHILS NFR BLD AUTO: 1 %
EOSINOPHIL # BLD AUTO: 0.2 10E3/UL (ref 0–0.7)
EOSINOPHIL NFR BLD AUTO: 3 %
ERYTHROCYTE [DISTWIDTH] IN BLOOD BY AUTOMATED COUNT: 13.3 % (ref 10–15)
HCT VFR BLD AUTO: 45.9 % (ref 40–53)
HGB BLD-MCNC: 14.8 G/DL (ref 13.3–17.7)
IMM GRANULOCYTES # BLD: 0 10E3/UL
IMM GRANULOCYTES NFR BLD: 0 %
LYMPHOCYTES # BLD AUTO: 2.5 10E3/UL (ref 0.8–5.3)
LYMPHOCYTES NFR BLD AUTO: 33 %
MCH RBC QN AUTO: 30.4 PG (ref 26.5–33)
MCHC RBC AUTO-ENTMCNC: 32.2 G/DL (ref 31.5–36.5)
MCV RBC AUTO: 94 FL (ref 78–100)
MONOCYTES # BLD AUTO: 0.7 10E3/UL (ref 0–1.3)
MONOCYTES NFR BLD AUTO: 9 %
NEUTROPHILS # BLD AUTO: 4.2 10E3/UL (ref 1.6–8.3)
NEUTROPHILS NFR BLD AUTO: 54 %
PLATELET # BLD AUTO: 253 10E3/UL (ref 150–450)
RBC # BLD AUTO: 4.87 10E6/UL (ref 4.4–5.9)
WBC # BLD AUTO: 7.7 10E3/UL (ref 4–11)

## 2024-06-10 PROCEDURE — 36415 COLL VENOUS BLD VENIPUNCTURE: CPT

## 2024-06-10 PROCEDURE — 85025 COMPLETE CBC W/AUTO DIFF WBC: CPT

## 2024-06-11 ENCOUNTER — TELEPHONE (OUTPATIENT)
Dept: FAMILY MEDICINE | Facility: CLINIC | Age: 54
End: 2024-06-11

## 2024-06-11 ENCOUNTER — OFFICE VISIT (OUTPATIENT)
Dept: WOUND CARE | Facility: CLINIC | Age: 54
End: 2024-06-11
Payer: COMMERCIAL

## 2024-06-11 ENCOUNTER — MEDICAL CORRESPONDENCE (OUTPATIENT)
Dept: HEALTH INFORMATION MANAGEMENT | Facility: CLINIC | Age: 54
End: 2024-06-11

## 2024-06-11 DIAGNOSIS — Z93.2 STATUS POST ILEOSTOMY (H): Primary | ICD-10-CM

## 2024-06-11 DIAGNOSIS — Z90.49 HISTORY OF COLON RESECTION: ICD-10-CM

## 2024-06-11 PROCEDURE — 99211 OFF/OP EST MAY X REQ PHY/QHP: CPT

## 2024-06-11 RX ORDER — OCTREOTIDE ACETATE 100 UG/ML
100 INJECTION, SOLUTION INTRAVENOUS; SUBCUTANEOUS DAILY
Qty: 90 ML | Refills: 3 | Status: SHIPPED | OUTPATIENT
Start: 2024-06-11

## 2024-06-11 NOTE — TELEPHONE ENCOUNTER
Patient's group home nurse calling to report they are low on octreotide 100mcg/ml    States there is only one bottle remaining and only has 0.5ml amount available to equal 50mcg    She is awaiting call back from nurse supervisor on status of medication refill    Ok to give 50mcg now or hold medication please advise    Jackie Skaggs RN  [facility]

## 2024-06-11 NOTE — PROGRESS NOTES
"[facility] Ostomy Assessment  Patient comes to clinic for consultation regarding ostomy issues.    Procedure: laparoscopic converted to open total abdominal colectomy 5/11/2016 at [facility]  Dx related to ostomy:Megacolon obstructions  Consulted per Dr Latonia Neville    Subjective:He is here with staff member from [facility] and ostomy care is provided by self. Pt is completely soiled with stool, His pouch is taped on with Ducktape. He can not go back home in these clothes so he was provided with patient shorts and a gown. Patient's reason for visit: \"frequent leaking, skin breakdown. Spoke with  and they do not have orders to change patient's pouch so they don't. The carpet of the [facility] has been soiled by pts leaking pouch\"     The quantity of ostomy supplies needed by a beneficiary is determined primarily by the type of ostomy, its location, its construction, and the condition of the skin surface surrounding the stoma.    Objective:  Type: Ileostomy since 2016  Stoma: 28 mm  end normal-appearing   Location: right upper quadrant     Peristomal skin: sever erosion of epidermis  and barrier is taped on with duct tape  Output:  watery and soft    Frequency of pouch change:Per staff member at [facility] pouches are replaced by pt several times per day. They are running out of pouches due to frequent leaking which is caused by severe skin breakdown  Ordering supplies from:  [facility], Fax: [phone], Customer Service: [phone]    Current pouch system/supplies: Ara   one piece and flat    Assessment: Current pouching system  not adequate Pt will need convex and belt with miguel a ring due to watery output. Might need to consider Imodium to slow down his output      Intervention/Plan: Pouch will need to be changed by staff but manager of [facility] to get that approved. Pt was given 10 pouches belt and Miguel A ring. To change daily for the next 7 days so " "his skin can heal. Messaged his PCP regarding this situation    Ara 8283 (Precut to 1 1/8\", Roshan ring [id number] (not substitutes), belt 0046  Change pouch daily for 1 week then every other day    Return to clinic 1 month(s)  . Treated and follow-up appointment made     Dr Sanchez  was available for supervision of care if needed or if questions should arise and regarding plan of care. Chary Dos Santos, RN  RN [facility]      "

## 2024-06-11 NOTE — TELEPHONE ENCOUNTER
May give Octreotide 50 mcg subcutaneously daily now and rx refilled for the 100 mcg dosage sent to pharmacy.

## 2024-06-11 NOTE — TELEPHONE ENCOUNTER
Called Jenny, and she stated that she found the 100 mcg dosage.    Madalyn BSN RN  Triage Nurse  [facility]

## 2024-06-11 NOTE — PATIENT INSTRUCTIONS
"Ara 8283 (Precut to 1 1/8\", Roshan ring [phone] (not substitutes), belt 5317  Change pouch daily for 1 week then every other day    "

## 2024-06-18 ENCOUNTER — LAB (OUTPATIENT)
Dept: LAB | Facility: CLINIC | Age: 54
End: 2024-06-18
Payer: COMMERCIAL

## 2024-06-18 DIAGNOSIS — F25.9 SCHIZOAFFECTIVE DISORDER, SUBCHRONIC CONDITION (H): ICD-10-CM

## 2024-06-18 LAB
BASOPHILS # BLD AUTO: 0.1 10E3/UL (ref 0–0.2)
BASOPHILS NFR BLD AUTO: 1 %
EOSINOPHIL # BLD AUTO: 0.2 10E3/UL (ref 0–0.7)
EOSINOPHIL NFR BLD AUTO: 3 %
ERYTHROCYTE [DISTWIDTH] IN BLOOD BY AUTOMATED COUNT: 13.3 % (ref 10–15)
HCT VFR BLD AUTO: 43.6 % (ref 40–53)
HGB BLD-MCNC: 14.4 G/DL (ref 13.3–17.7)
IMM GRANULOCYTES # BLD: 0 10E3/UL
IMM GRANULOCYTES NFR BLD: 0 %
LYMPHOCYTES # BLD AUTO: 2.1 10E3/UL (ref 0.8–5.3)
LYMPHOCYTES NFR BLD AUTO: 26 %
MCH RBC QN AUTO: 30.3 PG (ref 26.5–33)
MCHC RBC AUTO-ENTMCNC: 33 G/DL (ref 31.5–36.5)
MCV RBC AUTO: 92 FL (ref 78–100)
MONOCYTES # BLD AUTO: 0.6 10E3/UL (ref 0–1.3)
MONOCYTES NFR BLD AUTO: 8 %
NEUTROPHILS # BLD AUTO: 5 10E3/UL (ref 1.6–8.3)
NEUTROPHILS NFR BLD AUTO: 63 %
PLATELET # BLD AUTO: 267 10E3/UL (ref 150–450)
RBC # BLD AUTO: 4.75 10E6/UL (ref 4.4–5.9)
WBC # BLD AUTO: 7.9 10E3/UL (ref 4–11)

## 2024-06-18 PROCEDURE — 85025 COMPLETE CBC W/AUTO DIFF WBC: CPT

## 2024-06-18 PROCEDURE — 36415 COLL VENOUS BLD VENIPUNCTURE: CPT

## 2024-06-19 ENCOUNTER — NURSE TRIAGE (OUTPATIENT)
Dept: FAMILY MEDICINE | Facility: CLINIC | Age: 54
End: 2024-06-19
Payer: COMMERCIAL

## 2024-06-19 DIAGNOSIS — G47.09 OTHER INSOMNIA: Primary | ICD-10-CM

## 2024-06-19 RX ORDER — LORAZEPAM 0.5 MG/1
0.5 TABLET ORAL
Qty: 30 TABLET | Refills: 0 | Status: SHIPPED | OUTPATIENT
Start: 2024-06-19

## 2024-06-19 RX ORDER — TRAZODONE HYDROCHLORIDE 50 MG/1
50 TABLET, FILM COATED ORAL
Qty: 30 TABLET | Refills: 3 | Status: SHIPPED | OUTPATIENT
Start: 2024-06-19

## 2024-06-19 NOTE — TELEPHONE ENCOUNTER
Dianne ([facility] staff) was updated. She stated that the patient is only prescribed Lorazepam 0.5 mg 1 x daily. They are giving this lorazepam dose during the day due to his daily pacing.     She is wondering if Dr. Neville would be willing to prescribe the lorazepam as a PRN so they can give it at night if necessary with trazodone?     RN encouraged her to reach out to the prescribing provider for this script. She verbalized understanding but would appreciate Dr. Headley response as well.    Cassie Sands RN on 6/19/2024 at 11:42 AM

## 2024-06-19 NOTE — TELEPHONE ENCOUNTER
Dianne returned call. Provider's message relayed. Dianne verbalized understanding and agrees with plan.    Rosario Land RN  [facility]

## 2024-06-19 NOTE — TELEPHONE ENCOUNTER
RN left message to return call to clinic [phone].  (RN did not leave specific details on voicemail for confidential reasons)       Cassie Sands RN on 6/19/2024 at 1:49 PM

## 2024-06-19 NOTE — TELEPHONE ENCOUNTER
Rx for trazodone 50 mg po at bedtime prn insomnia sent to [place] pharmacy.  If unable to sleep after 30 minutes, may administer lorazepam 0.5 mg prescribed by psychiatry.  Advised to keep upcoming appointment with psychiatry.

## 2024-06-19 NOTE — TELEPHONE ENCOUNTER
Noted.  Rx also sent for lorazepam.  Again, lorazepam should be given after 30 minutes if patient is unable to sleep after the trazodone dosage.     1. Other insomnia  - traZODone (DESYREL) 50 MG tablet; Take 1 tablet (50 mg) by mouth nightly as needed for sleep  Dispense: 30 tablet; Refill: 3  - LORazepam (ATIVAN) 0.5 MG tablet; Take 1 tablet (0.5 mg) by mouth nightly as needed for anxiety or sleep  Dispense: 30 tablet; Refill: 0

## 2024-06-19 NOTE — TELEPHONE ENCOUNTER
Routing to PCP to review/advise    Please route back to BE RN pool    Appointment with primary or reach out to psychiatry?    Patient is having increased pacing, has not slept in 4 days.    Facility requesting something for sleep?  Trazadone    Patient has appointment with psychiatry next week.  Missed appointment 6/10      Per facility, patient has been becoming more anxious.  In creased pacing and patient not sleeping.    Patient follow staff around requesting food.  Persistent until staff gives in to him.    Psychiatry prescribed 0.5 mg ativan 5/23    Psychiatry visit 5/23    Diagnoses and all orders for this visit:    Schizophrenia, chronic condition with acute exacerbation (CMS/[facility])  History of chronic schizophrenia with intermittent agitation, disorganization, delusional thinking. Recently required IP psychiatric hospitalization with retitration on Clozapine after non-adherence. Accompanied by  staff today with specific concerns for agitation and irritability around cares (changing colostomy bag). Hopeful for a PRN. I did discuss this with patient who also does have some distress with cares. Will start low dose Lorazepam, did tolerate this well IP. Reviewed risks with patient and staff most notably dizziness and falls. Otherwise good adherence to medication regimen, no specific side effect concerns with Clozapine    Clozapine 450 mg HS  Risperidone 2 mg BID  Effexor  mg daily  Lorazepam 0.5 mg daily PRN for agitation    Agitation  START PRN Lorazepam as above   Reason for Disposition   [1] Patient on psych medication prescribed by their PCP AND [2] has medication questions or needs refill   MODERATE anxiety (e.g., persistent or frequent anxiety symptoms; interferes with sleep, school, or work)    Additional Information   Negative: Physical violence occurring now (e.g., hurting others or destroying property) (Exception: young child that can be stopped)   Negative: [1] Patient is threatening violence AND  [2] has a deadly weapon (e.g., firearm, knife)   Negative: [1] Patient is threatening serious harm to others AND [2] is unwilling to come in   Negative: Family does not feel safe at home now   Negative: Sounds like a life-threatening emergency to the triager   Negative: [1] Age 5 years or younger AND [2] aggressive behavior problems   Negative: Homicidal threats or attempts   Negative: Suicidal behavior is the main concern   Negative: Self-inflicted cuts (e.g., cutting, self-mutilator)   Negative: Panic attack or severe anxiety is main problem   Negative: Injuries needing medical treatment (e.g., suspected fractures, lacerations, human bites, head injuries)   Negative: [1] Patient has harmed or is threatening harm to others AND [2] is willing to come in   Negative: Threats of starting house or any structure on fire now   Negative: [1] Drug or alcohol use suspected AND [2] symptoms now   Negative: [1] Threats of running away now AND [2] child can't be controlled   Negative: Patient is extremely upset (e.g., can't be calmed down)   Negative: Caller requesting urgent psych evaluation or hospitalization   Negative: Caller is afraid they might hurt the patient (child)   Negative: [1] Patient showing increased anger AND [2] has injured others in the past (Exception: minor injuries) AND [3] family feels safe now   Negative: [1] Minor injuries (e.g., bruises or scratches) have occurred AND [2] family feels safe now   Negative: [1] Bizarre changes in behavior AND [2] sudden onset   Negative: SEVERE difficulty breathing (e.g., struggling for each breath, speaks in single words)   Negative: Bluish (or gray) lips or face   Negative: Difficult to awaken or acting confused (e.g., disoriented, slurred speech)   Negative: Hysterical or combative behavior   Negative: Sounds like a life-threatening emergency to the triager   Negative: Chest pain   Negative: Palpitations, skipped heartbeat, or rapid heartbeat is main symptom    Negative: Cough is main symptom   Negative: Suicide thoughts, threats, attempts, or questions   Negative: Depression is main problem or symptom (e.g., feelings of sadness or hopelessness)   Negative: Difficulty breathing and persists > 10 minutes and not relieved by reassurance provided by triager   Negative: Lightheadedness or dizziness and persists > 10 minutes and not relieved by reassurance provided by triager   Negative: SEVERE anxiety (e.g., extremely anxious with intense emotional symptoms such as feeling of unreality, urge to flee, unable to calm down; unable to cope or function), which is not better after 10 minutes of reassurance and Care Advice   Negative: Panic attack symptoms (e.g., sudden onset of intense fear and symptoms such as dizziness, feeling of impending doom or fear of dying, hyperventilation, numbness or tingling, sweating, trembling), and has not been evaluated for this by doctor (or NP/PA)   Negative: Panic attack symptoms (diagnosed in the past) that is not better with usual treatment, reassurance, or Care Advice   Negative: Alcohol or drug use, known or suspected, and feeling very shaky (i.e., visible tremors of hands)   Negative: Patient sounds very sick or weak to the triager   Negative: Patient sounds very upset or troubled to the triager    Answer Assessment - Initial Assessment Questions  See documentation    Protocols used: Aggressive and Destructive Behavior-P-AH, Anxiety and Panic Attack-A-OH      Yimi Nolasco, RN, BSN, PHN  [facility]

## 2024-06-24 ENCOUNTER — LAB (OUTPATIENT)
Dept: LAB | Facility: CLINIC | Age: 54
End: 2024-06-24
Payer: COMMERCIAL

## 2024-06-24 DIAGNOSIS — F25.9 SCHIZOAFFECTIVE DISORDER, SUBCHRONIC CONDITION (H): ICD-10-CM

## 2024-06-24 LAB
BASOPHILS # BLD AUTO: 0.1 10E3/UL (ref 0–0.2)
BASOPHILS NFR BLD AUTO: 1 %
EOSINOPHIL # BLD AUTO: 0.2 10E3/UL (ref 0–0.7)
EOSINOPHIL NFR BLD AUTO: 3 %
ERYTHROCYTE [DISTWIDTH] IN BLOOD BY AUTOMATED COUNT: 13.3 % (ref 10–15)
HCT VFR BLD AUTO: 45.5 % (ref 40–53)
HGB BLD-MCNC: 14.8 G/DL (ref 13.3–17.7)
IMM GRANULOCYTES # BLD: 0 10E3/UL
IMM GRANULOCYTES NFR BLD: 0 %
LYMPHOCYTES # BLD AUTO: 2.4 10E3/UL (ref 0.8–5.3)
LYMPHOCYTES NFR BLD AUTO: 29 %
MCH RBC QN AUTO: 29.9 PG (ref 26.5–33)
MCHC RBC AUTO-ENTMCNC: 32.5 G/DL (ref 31.5–36.5)
MCV RBC AUTO: 92 FL (ref 78–100)
MONOCYTES # BLD AUTO: 0.6 10E3/UL (ref 0–1.3)
MONOCYTES NFR BLD AUTO: 7 %
NEUTROPHILS # BLD AUTO: 5 10E3/UL (ref 1.6–8.3)
NEUTROPHILS NFR BLD AUTO: 60 %
PLATELET # BLD AUTO: 276 10E3/UL (ref 150–450)
RBC # BLD AUTO: 4.95 10E6/UL (ref 4.4–5.9)
WBC # BLD AUTO: 8.3 10E3/UL (ref 4–11)

## 2024-06-24 PROCEDURE — 36415 COLL VENOUS BLD VENIPUNCTURE: CPT

## 2024-06-24 PROCEDURE — 85025 COMPLETE CBC W/AUTO DIFF WBC: CPT

## 2024-06-26 ENCOUNTER — TELEPHONE (OUTPATIENT)
Dept: FAMILY MEDICINE | Facility: CLINIC | Age: 54
End: 2024-06-26
Payer: COMMERCIAL

## 2024-06-26 NOTE — TELEPHONE ENCOUNTER
Forms received from: [employer]    Phone number listed: [phone]   Fax listed: [phone]  Date received: 6/21/24  Form description: Standard written order: [facility]hab [employer]  Once forms are completed, please return to [employer]  via fax.  Is patient requesting to be contacted when forms are completed: na  Phone: na  Form placed:  Dr. Kelin Hope

## 2024-07-01 ENCOUNTER — LAB (OUTPATIENT)
Dept: LAB | Facility: CLINIC | Age: 54
End: 2024-07-01
Payer: COMMERCIAL

## 2024-07-01 DIAGNOSIS — F25.9 SCHIZOAFFECTIVE DISORDER, SUBCHRONIC CONDITION (H): ICD-10-CM

## 2024-07-01 LAB
BASOPHILS # BLD AUTO: 0 10E3/UL (ref 0–0.2)
BASOPHILS NFR BLD AUTO: 1 %
EOSINOPHIL # BLD AUTO: 0.3 10E3/UL (ref 0–0.7)
EOSINOPHIL NFR BLD AUTO: 4 %
ERYTHROCYTE [DISTWIDTH] IN BLOOD BY AUTOMATED COUNT: 13.3 % (ref 10–15)
HCT VFR BLD AUTO: 44 % (ref 40–53)
HGB BLD-MCNC: 14.4 G/DL (ref 13.3–17.7)
IMM GRANULOCYTES # BLD: 0 10E3/UL
IMM GRANULOCYTES NFR BLD: 0 %
LYMPHOCYTES # BLD AUTO: 2.6 10E3/UL (ref 0.8–5.3)
LYMPHOCYTES NFR BLD AUTO: 35 %
MCH RBC QN AUTO: 30.7 PG (ref 26.5–33)
MCHC RBC AUTO-ENTMCNC: 32.7 G/DL (ref 31.5–36.5)
MCV RBC AUTO: 94 FL (ref 78–100)
MONOCYTES # BLD AUTO: 0.6 10E3/UL (ref 0–1.3)
MONOCYTES NFR BLD AUTO: 8 %
NEUTROPHILS # BLD AUTO: 3.9 10E3/UL (ref 1.6–8.3)
NEUTROPHILS NFR BLD AUTO: 54 %
PLATELET # BLD AUTO: 244 10E3/UL (ref 150–450)
RBC # BLD AUTO: 4.69 10E6/UL (ref 4.4–5.9)
WBC # BLD AUTO: 7.4 10E3/UL (ref 4–11)

## 2024-07-01 PROCEDURE — 85025 COMPLETE CBC W/AUTO DIFF WBC: CPT

## 2024-07-01 PROCEDURE — 36415 COLL VENOUS BLD VENIPUNCTURE: CPT

## 2024-07-08 ENCOUNTER — LAB (OUTPATIENT)
Dept: LAB | Facility: CLINIC | Age: 54
End: 2024-07-08
Payer: COMMERCIAL

## 2024-07-08 DIAGNOSIS — F25.9 SCHIZOAFFECTIVE DISORDER, SUBCHRONIC CONDITION (H): ICD-10-CM

## 2024-07-08 LAB
BASOPHILS # BLD AUTO: 0 10E3/UL (ref 0–0.2)
BASOPHILS NFR BLD AUTO: 0 %
EOSINOPHIL # BLD AUTO: 0.2 10E3/UL (ref 0–0.7)
EOSINOPHIL NFR BLD AUTO: 3 %
ERYTHROCYTE [DISTWIDTH] IN BLOOD BY AUTOMATED COUNT: 13.5 % (ref 10–15)
HCT VFR BLD AUTO: 46.6 % (ref 40–53)
HGB BLD-MCNC: 14.9 G/DL (ref 13.3–17.7)
IMM GRANULOCYTES # BLD: 0 10E3/UL
IMM GRANULOCYTES NFR BLD: 0 %
LYMPHOCYTES # BLD AUTO: 1.8 10E3/UL (ref 0.8–5.3)
LYMPHOCYTES NFR BLD AUTO: 27 %
MCH RBC QN AUTO: 30.1 PG (ref 26.5–33)
MCHC RBC AUTO-ENTMCNC: 32 G/DL (ref 31.5–36.5)
MCV RBC AUTO: 94 FL (ref 78–100)
MONOCYTES # BLD AUTO: 0.4 10E3/UL (ref 0–1.3)
MONOCYTES NFR BLD AUTO: 6 %
NEUTROPHILS # BLD AUTO: 4.2 10E3/UL (ref 1.6–8.3)
NEUTROPHILS NFR BLD AUTO: 64 %
PLATELET # BLD AUTO: 261 10E3/UL (ref 150–450)
RBC # BLD AUTO: 4.95 10E6/UL (ref 4.4–5.9)
WBC # BLD AUTO: 6.6 10E3/UL (ref 4–11)

## 2024-07-08 PROCEDURE — 85025 COMPLETE CBC W/AUTO DIFF WBC: CPT

## 2024-07-08 PROCEDURE — 36415 COLL VENOUS BLD VENIPUNCTURE: CPT

## 2024-07-11 ENCOUNTER — MEDICAL CORRESPONDENCE (OUTPATIENT)
Dept: HEALTH INFORMATION MANAGEMENT | Facility: CLINIC | Age: 54
End: 2024-07-11
Payer: COMMERCIAL

## 2024-07-15 ENCOUNTER — LAB (OUTPATIENT)
Dept: LAB | Facility: CLINIC | Age: 54
End: 2024-07-15
Payer: COMMERCIAL

## 2024-07-15 DIAGNOSIS — F25.9 SCHIZOAFFECTIVE DISORDER, SUBCHRONIC CONDITION (H): ICD-10-CM

## 2024-07-15 LAB
BASOPHILS # BLD AUTO: 0.1 10E3/UL (ref 0–0.2)
BASOPHILS NFR BLD AUTO: 1 %
EOSINOPHIL # BLD AUTO: 0.3 10E3/UL (ref 0–0.7)
EOSINOPHIL NFR BLD AUTO: 3 %
ERYTHROCYTE [DISTWIDTH] IN BLOOD BY AUTOMATED COUNT: 13.6 % (ref 10–15)
HCT VFR BLD AUTO: 46.2 % (ref 40–53)
HGB BLD-MCNC: 15 G/DL (ref 13.3–17.7)
IMM GRANULOCYTES # BLD: 0 10E3/UL
IMM GRANULOCYTES NFR BLD: 0 %
LYMPHOCYTES # BLD AUTO: 3 10E3/UL (ref 0.8–5.3)
LYMPHOCYTES NFR BLD AUTO: 34 %
MCH RBC QN AUTO: 30.5 PG (ref 26.5–33)
MCHC RBC AUTO-ENTMCNC: 32.5 G/DL (ref 31.5–36.5)
MCV RBC AUTO: 94 FL (ref 78–100)
MONOCYTES # BLD AUTO: 0.7 10E3/UL (ref 0–1.3)
MONOCYTES NFR BLD AUTO: 7 %
NEUTROPHILS # BLD AUTO: 4.9 10E3/UL (ref 1.6–8.3)
NEUTROPHILS NFR BLD AUTO: 55 %
PLATELET # BLD AUTO: 247 10E3/UL (ref 150–450)
RBC # BLD AUTO: 4.91 10E6/UL (ref 4.4–5.9)
WBC # BLD AUTO: 8.9 10E3/UL (ref 4–11)

## 2024-07-15 PROCEDURE — 36415 COLL VENOUS BLD VENIPUNCTURE: CPT

## 2024-07-15 PROCEDURE — 85025 COMPLETE CBC W/AUTO DIFF WBC: CPT

## 2024-07-22 ENCOUNTER — LAB (OUTPATIENT)
Dept: LAB | Facility: CLINIC | Age: 54
End: 2024-07-22
Payer: COMMERCIAL

## 2024-07-22 DIAGNOSIS — F25.9 SCHIZOAFFECTIVE DISORDER, SUBCHRONIC CONDITION (H): ICD-10-CM

## 2024-07-22 LAB
BASOPHILS # BLD AUTO: 0.1 10E3/UL (ref 0–0.2)
BASOPHILS NFR BLD AUTO: 1 %
EOSINOPHIL # BLD AUTO: 0.3 10E3/UL (ref 0–0.7)
EOSINOPHIL NFR BLD AUTO: 3 %
ERYTHROCYTE [DISTWIDTH] IN BLOOD BY AUTOMATED COUNT: 13.8 % (ref 10–15)
HCT VFR BLD AUTO: 46.1 % (ref 40–53)
HGB BLD-MCNC: 15.3 G/DL (ref 13.3–17.7)
IMM GRANULOCYTES # BLD: 0 10E3/UL
IMM GRANULOCYTES NFR BLD: 0 %
LYMPHOCYTES # BLD AUTO: 2.9 10E3/UL (ref 0.8–5.3)
LYMPHOCYTES NFR BLD AUTO: 32 %
MCH RBC QN AUTO: 31.3 PG (ref 26.5–33)
MCHC RBC AUTO-ENTMCNC: 33.2 G/DL (ref 31.5–36.5)
MCV RBC AUTO: 94 FL (ref 78–100)
MONOCYTES # BLD AUTO: 0.7 10E3/UL (ref 0–1.3)
MONOCYTES NFR BLD AUTO: 8 %
NEUTROPHILS # BLD AUTO: 5.1 10E3/UL (ref 1.6–8.3)
NEUTROPHILS NFR BLD AUTO: 57 %
PLATELET # BLD AUTO: 278 10E3/UL (ref 150–450)
RBC # BLD AUTO: 4.89 10E6/UL (ref 4.4–5.9)
WBC # BLD AUTO: 9.1 10E3/UL (ref 4–11)

## 2024-07-22 PROCEDURE — 36415 COLL VENOUS BLD VENIPUNCTURE: CPT

## 2024-07-22 PROCEDURE — 85025 COMPLETE CBC W/AUTO DIFF WBC: CPT

## 2024-07-31 ENCOUNTER — TELEPHONE (OUTPATIENT)
Dept: FAMILY MEDICINE | Facility: CLINIC | Age: 54
End: 2024-07-31
Payer: COMMERCIAL

## 2024-07-31 NOTE — TELEPHONE ENCOUNTER
Patient states that menses are longer and heavier. Patient states that her menses are annoying.  Patient is interested in making her cycles better.   Poppy, Manager at pt's facility calling to schedule an appt with PCP, Dr. Neville. No openings until October. States pt had Lipids and A1C done by psychiatry and triglycerides and A1C were high. He did not know values. RN assisted with scheduling appt with another available BE provider.    Rosario Land RN  [facility]

## 2024-08-06 ENCOUNTER — LAB (OUTPATIENT)
Dept: LAB | Facility: CLINIC | Age: 54
End: 2024-08-06
Payer: COMMERCIAL

## 2024-08-06 DIAGNOSIS — E11.8 DIABETES MELLITUS TYPE 2 WITH COMPLICATIONS (H): ICD-10-CM

## 2024-08-06 DIAGNOSIS — F25.9 SCHIZOAFFECTIVE DISORDER, SUBCHRONIC CONDITION (H): ICD-10-CM

## 2024-08-06 LAB
BASOPHILS # BLD AUTO: 0 10E3/UL (ref 0–0.2)
BASOPHILS NFR BLD AUTO: 1 %
EOSINOPHIL # BLD AUTO: 0.3 10E3/UL (ref 0–0.7)
EOSINOPHIL NFR BLD AUTO: 3 %
ERYTHROCYTE [DISTWIDTH] IN BLOOD BY AUTOMATED COUNT: 13.9 % (ref 10–15)
HCT VFR BLD AUTO: 39.3 % (ref 40–53)
HGB BLD-MCNC: 12.7 G/DL (ref 13.3–17.7)
IMM GRANULOCYTES # BLD: 0 10E3/UL
IMM GRANULOCYTES NFR BLD: 0 %
LYMPHOCYTES # BLD AUTO: 1.5 10E3/UL (ref 0.8–5.3)
LYMPHOCYTES NFR BLD AUTO: 18 %
MCH RBC QN AUTO: 30.9 PG (ref 26.5–33)
MCHC RBC AUTO-ENTMCNC: 32.3 G/DL (ref 31.5–36.5)
MCV RBC AUTO: 96 FL (ref 78–100)
MONOCYTES # BLD AUTO: 0.6 10E3/UL (ref 0–1.3)
MONOCYTES NFR BLD AUTO: 7 %
NEUTROPHILS # BLD AUTO: 5.7 10E3/UL (ref 1.6–8.3)
NEUTROPHILS NFR BLD AUTO: 71 %
PLATELET # BLD AUTO: 246 10E3/UL (ref 150–450)
RBC # BLD AUTO: 4.11 10E6/UL (ref 4.4–5.9)
WBC # BLD AUTO: 8.1 10E3/UL (ref 4–11)

## 2024-08-06 PROCEDURE — 85025 COMPLETE CBC W/AUTO DIFF WBC: CPT

## 2024-08-06 PROCEDURE — 36415 COLL VENOUS BLD VENIPUNCTURE: CPT

## 2024-08-06 RX ORDER — INSULIN GLARGINE 100 [IU]/ML
6 INJECTION, SOLUTION SUBCUTANEOUS AT BEDTIME
Qty: 11 ML | Refills: 1 | Status: SHIPPED | OUTPATIENT
Start: 2024-08-06

## 2024-08-12 ENCOUNTER — LAB (OUTPATIENT)
Dept: LAB | Facility: CLINIC | Age: 54
End: 2024-08-12
Payer: COMMERCIAL

## 2024-08-12 DIAGNOSIS — E11.8 DIABETES MELLITUS TYPE 2 WITH COMPLICATIONS (H): Primary | ICD-10-CM

## 2024-08-12 DIAGNOSIS — F25.9 SCHIZOAFFECTIVE DISORDER, SUBCHRONIC CONDITION (H): ICD-10-CM

## 2024-08-12 LAB
BASOPHILS # BLD AUTO: 0 10E3/UL (ref 0–0.2)
BASOPHILS NFR BLD AUTO: 1 %
EOSINOPHIL # BLD AUTO: 0.2 10E3/UL (ref 0–0.7)
EOSINOPHIL NFR BLD AUTO: 2 %
ERYTHROCYTE [DISTWIDTH] IN BLOOD BY AUTOMATED COUNT: 14.5 % (ref 10–15)
HCT VFR BLD AUTO: 38.8 % (ref 40–53)
HGB BLD-MCNC: 12.5 G/DL (ref 13.3–17.7)
IMM GRANULOCYTES # BLD: 0 10E3/UL
IMM GRANULOCYTES NFR BLD: 0 %
LYMPHOCYTES # BLD AUTO: 2.1 10E3/UL (ref 0.8–5.3)
LYMPHOCYTES NFR BLD AUTO: 25 %
MCH RBC QN AUTO: 30.9 PG (ref 26.5–33)
MCHC RBC AUTO-ENTMCNC: 32.2 G/DL (ref 31.5–36.5)
MCV RBC AUTO: 96 FL (ref 78–100)
MONOCYTES # BLD AUTO: 0.7 10E3/UL (ref 0–1.3)
MONOCYTES NFR BLD AUTO: 8 %
NEUTROPHILS # BLD AUTO: 5.4 10E3/UL (ref 1.6–8.3)
NEUTROPHILS NFR BLD AUTO: 64 %
PLATELET # BLD AUTO: 327 10E3/UL (ref 150–450)
RBC # BLD AUTO: 4.04 10E6/UL (ref 4.4–5.9)
WBC # BLD AUTO: 8.4 10E3/UL (ref 4–11)

## 2024-08-12 PROCEDURE — 36415 COLL VENOUS BLD VENIPUNCTURE: CPT

## 2024-08-12 PROCEDURE — 85025 COMPLETE CBC W/AUTO DIFF WBC: CPT

## 2024-08-18 ENCOUNTER — HOSPITAL ENCOUNTER (EMERGENCY)
Facility: CLINIC | Age: 54
Discharge: HOME | End: 2024-08-18
Attending: EMERGENCY MEDICINE | Admitting: EMERGENCY MEDICINE
Payer: COMMERCIAL

## 2024-08-18 VITALS
BODY MASS INDEX: 36.49 KG/M2 | WEIGHT: 240 LBS | SYSTOLIC BLOOD PRESSURE: 149 MMHG | HEART RATE: 79 BPM | OXYGEN SATURATION: 95 % | RESPIRATION RATE: 18 BRPM | TEMPERATURE: 99.2 F | DIASTOLIC BLOOD PRESSURE: 85 MMHG

## 2024-08-18 DIAGNOSIS — R73.9 HYPERGLYCEMIA: ICD-10-CM

## 2024-08-18 DIAGNOSIS — Z86.59 HISTORY OF SCHIZOPHRENIA: ICD-10-CM

## 2024-08-18 LAB
GLUCOSE BLDC GLUCOMTR-MCNC: 182 MG/DL (ref 70–99)
GLUCOSE BLDC GLUCOMTR-MCNC: 188 MG/DL (ref 70–99)

## 2024-08-18 PROCEDURE — 99285 EMERGENCY DEPT VISIT HI MDM: CPT | Performed by: EMERGENCY MEDICINE

## 2024-08-18 PROCEDURE — 99284 EMERGENCY DEPT VISIT MOD MDM: CPT | Performed by: EMERGENCY MEDICINE

## 2024-08-18 PROCEDURE — 82962 GLUCOSE BLOOD TEST: CPT

## 2024-08-18 ASSESSMENT — ENCOUNTER SYMPTOMS
CONSTITUTIONAL NEGATIVE: 1
ENDOCRINE NEGATIVE: 1
MUSCULOSKELETAL NEGATIVE: 1
CARDIOVASCULAR NEGATIVE: 1
RESPIRATORY NEGATIVE: 1
EYES NEGATIVE: 1
ALLERGIC/IMMUNOLOGIC NEGATIVE: 1
HEMATOLOGIC/LYMPHATIC NEGATIVE: 1
GASTROINTESTINAL NEGATIVE: 1
NEUROLOGICAL NEGATIVE: 1
PSYCHIATRIC NEGATIVE: 1

## 2024-08-18 ASSESSMENT — ACTIVITIES OF DAILY LIVING (ADL)
ADLS_ACUITY_SCORE: 35

## 2024-08-18 ASSESSMENT — COLUMBIA-SUICIDE SEVERITY RATING SCALE - C-SSRS
2. HAVE YOU ACTUALLY HAD ANY THOUGHTS OF KILLING YOURSELF IN THE PAST MONTH?: NO
6. HAVE YOU EVER DONE ANYTHING, STARTED TO DO ANYTHING, OR PREPARED TO DO ANYTHING TO END YOUR LIFE?: NO
1. IN THE PAST MONTH, HAVE YOU WISHED YOU WERE DEAD OR WISHED YOU COULD GO TO SLEEP AND NOT WAKE UP?: NO

## 2024-08-18 NOTE — ED TRIAGE NOTES
Sent by group [place] due to BG reading of greater than 600, per EMS was 202 but they requested transport anyway     Triage Assessment (Adult)       Row Name 08/18/24 1306          Triage Assessment    Airway WDL WDL        Respiratory WDL    Respiratory WDL WDL        Peripheral/Neurovascular WDL    Peripheral Neurovascular WDL WDL        Cognitive/Neuro/Behavioral WDL    Cognitive/Neuro/Behavioral WDL WDL

## 2024-08-18 NOTE — ED NOTES
Bed: ED24  Expected date: 8/18/24  Expected time: 6:23 PM  Means of arrival: Ambulance ([id number])  Comments:
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
## 2024-08-18 NOTE — ED NOTES
Out walking in hallway, walked around unit, reoriented to where is, per EMS group home staff not coming until we call for ride home

## 2024-08-19 ENCOUNTER — LAB (OUTPATIENT)
Dept: LAB | Facility: CLINIC | Age: 54
End: 2024-08-19
Payer: COMMERCIAL

## 2024-08-19 DIAGNOSIS — F25.9 SCHIZOAFFECTIVE DISORDER, SUBCHRONIC CONDITION (H): ICD-10-CM

## 2024-08-19 DIAGNOSIS — E11.8 DIABETES MELLITUS TYPE 2 WITH COMPLICATIONS (H): ICD-10-CM

## 2024-08-19 LAB
ALBUMIN SERPL BCG-MCNC: 3.7 G/DL (ref 3.5–5.2)
ALP SERPL-CCNC: 147 U/L (ref 40–150)
ALT SERPL W P-5'-P-CCNC: 6 U/L (ref 0–70)
ANION GAP SERPL CALCULATED.3IONS-SCNC: 13 MMOL/L (ref 7–15)
AST SERPL W P-5'-P-CCNC: 17 U/L (ref 0–45)
BASOPHILS # BLD AUTO: 0 10E3/UL (ref 0–0.2)
BASOPHILS NFR BLD AUTO: 1 %
BILIRUB SERPL-MCNC: 0.3 MG/DL
BUN SERPL-MCNC: 14 MG/DL (ref 6–20)
CALCIUM SERPL-MCNC: 9.1 MG/DL (ref 8.8–10.4)
CHLORIDE SERPL-SCNC: 102 MMOL/L (ref 98–107)
CHOLEST SERPL-MCNC: 107 MG/DL
CREAT SERPL-MCNC: 1.17 MG/DL (ref 0.67–1.17)
EGFRCR SERPLBLD CKD-EPI 2021: 73 ML/MIN/1.73M2
EOSINOPHIL # BLD AUTO: 0.2 10E3/UL (ref 0–0.7)
EOSINOPHIL NFR BLD AUTO: 3 %
ERYTHROCYTE [DISTWIDTH] IN BLOOD BY AUTOMATED COUNT: 14.3 % (ref 10–15)
FASTING STATUS PATIENT QL REPORTED: ABNORMAL
FASTING STATUS PATIENT QL REPORTED: ABNORMAL
GLUCOSE SERPL-MCNC: 185 MG/DL (ref 70–99)
HBA1C MFR BLD: 6.6 % (ref 0–5.6)
HCO3 SERPL-SCNC: 24 MMOL/L (ref 22–29)
HCT VFR BLD AUTO: 44 % (ref 40–53)
HDLC SERPL-MCNC: 34 MG/DL
HGB BLD-MCNC: 14 G/DL (ref 13.3–17.7)
IMM GRANULOCYTES # BLD: 0 10E3/UL
IMM GRANULOCYTES NFR BLD: 0 %
LDLC SERPL CALC-MCNC: 30 MG/DL
LYMPHOCYTES # BLD AUTO: 1.9 10E3/UL (ref 0.8–5.3)
LYMPHOCYTES NFR BLD AUTO: 26 %
MCH RBC QN AUTO: 30.4 PG (ref 26.5–33)
MCHC RBC AUTO-ENTMCNC: 31.8 G/DL (ref 31.5–36.5)
MCV RBC AUTO: 96 FL (ref 78–100)
MONOCYTES # BLD AUTO: 0.6 10E3/UL (ref 0–1.3)
MONOCYTES NFR BLD AUTO: 7 %
NEUTROPHILS # BLD AUTO: 4.7 10E3/UL (ref 1.6–8.3)
NEUTROPHILS NFR BLD AUTO: 63 %
NONHDLC SERPL-MCNC: 73 MG/DL
PLATELET # BLD AUTO: 382 10E3/UL (ref 150–450)
POTASSIUM SERPL-SCNC: 4.8 MMOL/L (ref 3.4–5.3)
PROT SERPL-MCNC: 7.3 G/DL (ref 6.4–8.3)
RBC # BLD AUTO: 4.6 10E6/UL (ref 4.4–5.9)
SODIUM SERPL-SCNC: 139 MMOL/L (ref 135–145)
TRIGL SERPL-MCNC: 217 MG/DL
TSH SERPL DL<=0.005 MIU/L-ACNC: 0.4 UIU/ML (ref 0.3–4.2)
WBC # BLD AUTO: 7.5 10E3/UL (ref 4–11)

## 2024-08-19 PROCEDURE — 36415 COLL VENOUS BLD VENIPUNCTURE: CPT

## 2024-08-19 PROCEDURE — 83036 HEMOGLOBIN GLYCOSYLATED A1C: CPT

## 2024-08-19 PROCEDURE — 80061 LIPID PANEL: CPT

## 2024-08-19 PROCEDURE — 80053 COMPREHEN METABOLIC PANEL: CPT

## 2024-08-19 PROCEDURE — 84443 ASSAY THYROID STIM HORMONE: CPT

## 2024-08-19 PROCEDURE — 85025 COMPLETE CBC W/AUTO DIFF WBC: CPT

## 2024-08-19 NOTE — DISCHARGE INSTRUCTIONS
1) Jayden's blood glucose remained below 200 during his ED course of care. It is unclear why his blood glucose was greater than 600 during at the group home.    2) If there is new concerns including fever, mental status changes he should return to be evaluated.

## 2024-08-19 NOTE — ED NOTES
Spoke with [facility] home, they are okay with him coming back but unable to come get home, they request ambulance transport back

## 2024-08-22 ENCOUNTER — TELEPHONE (OUTPATIENT)
Dept: FAMILY MEDICINE | Facility: CLINIC | Age: 54
End: 2024-08-22

## 2024-08-22 NOTE — TELEPHONE ENCOUNTER
Reason for Call:  Other     Detailed comments: Needs orders for a cbc dif.    Phone Number Patient can be reached at: [facility]) [phone]    Best Time:     Can we leave a detailed message on this number? YES    Call taken on 8/22/2024 at 11:43 AM by Jacy Santos

## 2024-08-22 NOTE — TELEPHONE ENCOUNTER
Not sure who is calling requesting lab orders.     Patient no showed visit today where labs were to be done, Please reschedule patient.     Thank you,  Laverne Arana RN

## 2024-08-22 NOTE — TELEPHONE ENCOUNTER
I will not be signing orders.  I have never seen patients.  No-show to appointment today.  Will need to contact previous PCP.    Lesly SNOWC

## 2024-10-08 ENCOUNTER — LAB (OUTPATIENT)
Dept: LAB | Facility: CLINIC | Age: 54
End: 2024-10-08
Payer: COMMERCIAL

## 2024-10-08 DIAGNOSIS — F25.9 SCHIZOAFFECTIVE DISORDER, SUBCHRONIC CONDITION (H): ICD-10-CM

## 2024-10-08 LAB
BASOPHILS # BLD AUTO: 0 10E3/UL (ref 0–0.2)
BASOPHILS NFR BLD AUTO: 1 %
EOSINOPHIL # BLD AUTO: 0.2 10E3/UL (ref 0–0.7)
EOSINOPHIL NFR BLD AUTO: 3 %
ERYTHROCYTE [DISTWIDTH] IN BLOOD BY AUTOMATED COUNT: 13.5 % (ref 10–15)
HCT VFR BLD AUTO: 47.4 % (ref 40–53)
HGB BLD-MCNC: 15 G/DL (ref 13.3–17.7)
IMM GRANULOCYTES # BLD: 0 10E3/UL
IMM GRANULOCYTES NFR BLD: 0 %
LYMPHOCYTES # BLD AUTO: 1.6 10E3/UL (ref 0.8–5.3)
LYMPHOCYTES NFR BLD AUTO: 23 %
MCH RBC QN AUTO: 30.3 PG (ref 26.5–33)
MCHC RBC AUTO-ENTMCNC: 31.6 G/DL (ref 31.5–36.5)
MCV RBC AUTO: 96 FL (ref 78–100)
MONOCYTES # BLD AUTO: 0.4 10E3/UL (ref 0–1.3)
MONOCYTES NFR BLD AUTO: 6 %
NEUTROPHILS # BLD AUTO: 4.7 10E3/UL (ref 1.6–8.3)
NEUTROPHILS NFR BLD AUTO: 68 %
PLATELET # BLD AUTO: 261 10E3/UL (ref 150–450)
RBC # BLD AUTO: 4.95 10E6/UL (ref 4.4–5.9)
WBC # BLD AUTO: 6.9 10E3/UL (ref 4–11)

## 2024-10-08 PROCEDURE — 85025 COMPLETE CBC W/AUTO DIFF WBC: CPT

## 2024-10-08 PROCEDURE — 36415 COLL VENOUS BLD VENIPUNCTURE: CPT

## 2024-10-16 ENCOUNTER — LAB (OUTPATIENT)
Dept: LAB | Facility: CLINIC | Age: 54
End: 2024-10-16
Payer: COMMERCIAL

## 2024-10-16 DIAGNOSIS — F25.9 SCHIZOAFFECTIVE DISORDER, SUBCHRONIC CONDITION (H): ICD-10-CM

## 2024-10-16 LAB
BASOPHILS # BLD AUTO: 0.1 10E3/UL (ref 0–0.2)
BASOPHILS NFR BLD AUTO: 1 %
EOSINOPHIL # BLD AUTO: 0.2 10E3/UL (ref 0–0.7)
EOSINOPHIL NFR BLD AUTO: 2 %
ERYTHROCYTE [DISTWIDTH] IN BLOOD BY AUTOMATED COUNT: 13.2 % (ref 10–15)
HCT VFR BLD AUTO: 46.8 % (ref 40–53)
HGB BLD-MCNC: 14.9 G/DL (ref 13.3–17.7)
IMM GRANULOCYTES # BLD: 0 10E3/UL
IMM GRANULOCYTES NFR BLD: 0 %
LYMPHOCYTES # BLD AUTO: 2.2 10E3/UL (ref 0.8–5.3)
LYMPHOCYTES NFR BLD AUTO: 24 %
MCH RBC QN AUTO: 29.6 PG (ref 26.5–33)
MCHC RBC AUTO-ENTMCNC: 31.8 G/DL (ref 31.5–36.5)
MCV RBC AUTO: 93 FL (ref 78–100)
MONOCYTES # BLD AUTO: 0.6 10E3/UL (ref 0–1.3)
MONOCYTES NFR BLD AUTO: 7 %
NEUTROPHILS # BLD AUTO: 5.9 10E3/UL (ref 1.6–8.3)
NEUTROPHILS NFR BLD AUTO: 66 %
PLATELET # BLD AUTO: 275 10E3/UL (ref 150–450)
RBC # BLD AUTO: 5.04 10E6/UL (ref 4.4–5.9)
WBC # BLD AUTO: 8.9 10E3/UL (ref 4–11)

## 2024-10-16 PROCEDURE — 85025 COMPLETE CBC W/AUTO DIFF WBC: CPT

## 2024-10-16 PROCEDURE — 36415 COLL VENOUS BLD VENIPUNCTURE: CPT

## 2024-10-22 ENCOUNTER — LAB (OUTPATIENT)
Dept: LAB | Facility: CLINIC | Age: 54
End: 2024-10-22
Payer: COMMERCIAL

## 2024-10-22 DIAGNOSIS — F25.9 SCHIZOAFFECTIVE DISORDER, SUBCHRONIC CONDITION (H): ICD-10-CM

## 2024-10-22 LAB
BASOPHILS # BLD AUTO: 0.1 10E3/UL (ref 0–0.2)
BASOPHILS NFR BLD AUTO: 1 %
EOSINOPHIL # BLD AUTO: 0.2 10E3/UL (ref 0–0.7)
EOSINOPHIL NFR BLD AUTO: 3 %
ERYTHROCYTE [DISTWIDTH] IN BLOOD BY AUTOMATED COUNT: 13.4 % (ref 10–15)
HCT VFR BLD AUTO: 45.1 % (ref 40–53)
HGB BLD-MCNC: 14.3 G/DL (ref 13.3–17.7)
IMM GRANULOCYTES # BLD: 0 10E3/UL
IMM GRANULOCYTES NFR BLD: 0 %
LYMPHOCYTES # BLD AUTO: 1.8 10E3/UL (ref 0.8–5.3)
LYMPHOCYTES NFR BLD AUTO: 25 %
MCH RBC QN AUTO: 29.9 PG (ref 26.5–33)
MCHC RBC AUTO-ENTMCNC: 31.7 G/DL (ref 31.5–36.5)
MCV RBC AUTO: 94 FL (ref 78–100)
MONOCYTES # BLD AUTO: 0.6 10E3/UL (ref 0–1.3)
MONOCYTES NFR BLD AUTO: 8 %
NEUTROPHILS # BLD AUTO: 4.5 10E3/UL (ref 1.6–8.3)
NEUTROPHILS NFR BLD AUTO: 64 %
PLATELET # BLD AUTO: 237 10E3/UL (ref 150–450)
RBC # BLD AUTO: 4.78 10E6/UL (ref 4.4–5.9)
WBC # BLD AUTO: 7.1 10E3/UL (ref 4–11)

## 2024-10-22 PROCEDURE — 36415 COLL VENOUS BLD VENIPUNCTURE: CPT

## 2024-10-22 PROCEDURE — 85025 COMPLETE CBC W/AUTO DIFF WBC: CPT

## 2024-10-29 ENCOUNTER — LAB (OUTPATIENT)
Dept: LAB | Facility: CLINIC | Age: 54
End: 2024-10-29
Payer: COMMERCIAL

## 2024-10-29 DIAGNOSIS — F25.9 SCHIZOAFFECTIVE DISORDER, SUBCHRONIC CONDITION (H): ICD-10-CM

## 2024-10-29 LAB
BASOPHILS # BLD AUTO: 0.1 10E3/UL (ref 0–0.2)
BASOPHILS NFR BLD AUTO: 1 %
EOSINOPHIL # BLD AUTO: 0.2 10E3/UL (ref 0–0.7)
EOSINOPHIL NFR BLD AUTO: 2 %
ERYTHROCYTE [DISTWIDTH] IN BLOOD BY AUTOMATED COUNT: 13.5 % (ref 10–15)
HCT VFR BLD AUTO: 44.9 % (ref 40–53)
HGB BLD-MCNC: 14.5 G/DL (ref 13.3–17.7)
IMM GRANULOCYTES # BLD: 0 10E3/UL
IMM GRANULOCYTES NFR BLD: 0 %
LYMPHOCYTES # BLD AUTO: 2 10E3/UL (ref 0.8–5.3)
LYMPHOCYTES NFR BLD AUTO: 21 %
MCH RBC QN AUTO: 29.6 PG (ref 26.5–33)
MCHC RBC AUTO-ENTMCNC: 32.3 G/DL (ref 31.5–36.5)
MCV RBC AUTO: 92 FL (ref 78–100)
MONOCYTES # BLD AUTO: 0.6 10E3/UL (ref 0–1.3)
MONOCYTES NFR BLD AUTO: 6 %
NEUTROPHILS # BLD AUTO: 7 10E3/UL (ref 1.6–8.3)
NEUTROPHILS NFR BLD AUTO: 71 %
PLATELET # BLD AUTO: 251 10E3/UL (ref 150–450)
RBC # BLD AUTO: 4.9 10E6/UL (ref 4.4–5.9)
WBC # BLD AUTO: 9.9 10E3/UL (ref 4–11)

## 2024-10-29 PROCEDURE — 36415 COLL VENOUS BLD VENIPUNCTURE: CPT

## 2024-10-29 PROCEDURE — 85025 COMPLETE CBC W/AUTO DIFF WBC: CPT

## 2024-10-31 DIAGNOSIS — Z93.2 STATUS POST ILEOSTOMY (H): ICD-10-CM

## 2024-10-31 DIAGNOSIS — E11.8 DIABETES MELLITUS TYPE 2 WITH COMPLICATIONS (H): ICD-10-CM

## 2024-10-31 DIAGNOSIS — Z90.49 HISTORY OF COLON RESECTION: ICD-10-CM

## 2024-10-31 DIAGNOSIS — I10 BENIGN ESSENTIAL HYPERTENSION: ICD-10-CM

## 2024-10-31 DIAGNOSIS — E78.5 HYPERLIPIDEMIA LDL GOAL <100: ICD-10-CM

## 2024-10-31 DIAGNOSIS — F20.89 OTHER SCHIZOPHRENIA (H): ICD-10-CM

## 2024-10-31 RX ORDER — SODIUM BICARBONATE 650 MG/1
650 TABLET ORAL 2 TIMES DAILY
Qty: 180 TABLET | Refills: 3 | Status: CANCELLED | OUTPATIENT
Start: 2024-10-31

## 2024-10-31 RX ORDER — OCTREOTIDE ACETATE 100 UG/ML
100 INJECTION, SOLUTION INTRAVENOUS; SUBCUTANEOUS DAILY
Qty: 90 ML | Refills: 1 | Status: SHIPPED | OUTPATIENT
Start: 2024-10-31

## 2024-10-31 RX ORDER — RISPERIDONE 2 MG/1
2 TABLET ORAL 2 TIMES DAILY
Qty: 180 TABLET | Refills: 0 | Status: SHIPPED | OUTPATIENT
Start: 2024-10-31

## 2024-10-31 RX ORDER — INSULIN GLARGINE 100 [IU]/ML
6 INJECTION, SOLUTION SUBCUTANEOUS AT BEDTIME
Qty: 11 ML | Refills: 1 | Status: CANCELLED | OUTPATIENT
Start: 2024-10-31

## 2024-10-31 RX ORDER — METOPROLOL TARTRATE 50 MG
50 TABLET ORAL 2 TIMES DAILY
Qty: 180 TABLET | Refills: 0 | Status: SHIPPED | OUTPATIENT
Start: 2024-10-31

## 2024-10-31 RX ORDER — ATORVASTATIN CALCIUM 40 MG/1
40 TABLET, FILM COATED ORAL DAILY
Qty: 90 TABLET | Refills: 0 | Status: SHIPPED | OUTPATIENT
Start: 2024-10-31

## 2024-10-31 RX ORDER — FAMOTIDINE 20 MG
25 TABLET ORAL DAILY
Qty: 90 CAPSULE | Refills: 3 | Status: CANCELLED | OUTPATIENT
Start: 2024-10-31

## 2024-10-31 RX ORDER — ASPIRIN 81 MG/1
81 TABLET, COATED ORAL DAILY
Qty: 90 TABLET | Refills: 3 | Status: CANCELLED | OUTPATIENT
Start: 2024-10-31

## 2024-10-31 RX ORDER — OCTREOTIDE ACETATE 100 UG/ML
100 INJECTION, SOLUTION INTRAVENOUS; SUBCUTANEOUS DAILY
Qty: 90 ML | Refills: 3 | Status: CANCELLED | OUTPATIENT
Start: 2024-10-31

## 2024-10-31 RX ORDER — LANOLIN ALCOHOL/MO/W.PET/CERES
400 CREAM (GRAM) TOPICAL 3 TIMES DAILY
Qty: 270 TABLET | Refills: 3 | Status: CANCELLED | OUTPATIENT
Start: 2024-10-31

## 2024-10-31 RX ORDER — METOPROLOL TARTRATE 50 MG
50 TABLET ORAL 2 TIMES DAILY
Qty: 180 TABLET | Refills: 3 | Status: CANCELLED | OUTPATIENT
Start: 2024-10-31

## 2024-10-31 RX ORDER — RISPERIDONE 2 MG/1
2 TABLET ORAL 2 TIMES DAILY
Qty: 180 TABLET | Refills: 3 | Status: CANCELLED | OUTPATIENT
Start: 2024-10-31

## 2024-10-31 RX ORDER — PANTOPRAZOLE SODIUM 40 MG/1
40 TABLET, DELAYED RELEASE ORAL DAILY
Qty: 90 TABLET | Refills: 1 | Status: CANCELLED | OUTPATIENT
Start: 2024-10-31

## 2024-10-31 RX ORDER — SODIUM BICARBONATE 650 MG/1
650 TABLET ORAL 2 TIMES DAILY
Qty: 180 TABLET | Refills: 0 | Status: SHIPPED | OUTPATIENT
Start: 2024-10-31

## 2024-10-31 RX ORDER — ATORVASTATIN CALCIUM 40 MG/1
40 TABLET, FILM COATED ORAL DAILY
Qty: 90 TABLET | Refills: 3 | Status: CANCELLED | OUTPATIENT
Start: 2024-10-31

## 2024-10-31 NOTE — TELEPHONE ENCOUNTER
"I called [facility] [place] and spoke with pharmacist Anabel. Patient lives in a group home and his medications are filled in pill packs.     Patient's medications had been filled for a 30 day supply after being hospitalized and some of these were canceled out at the pharmacy.     I gave verbal orders for the remain refill according to the current Medication list. They will be filling the following Medications:    ASPIRIN LOW DOSE 81 MG EC (90  tablets with 1 refill)     Vitamin D, Cholecalciferol, 25 MCG (1000 UT)  (90 capsules with 0 refills)    Pantoprazole (PROTONIX) 40 MG EC (90 tablets with 0 refills)    LANTUS SOLOSTAR 100 UNIT/ML soln (15 ml with 0 refills)    MAGNESIUM OXIDE 400 (240 Mg) MG  (270 tablets/1 refill)    Patient has the following refills \"on file\" at [facility] location. Remaining refills sent to [facility] also.     Atorvastatin (LIPITOR) 40 MG tablet   Metoprolol tartrate (LOPRESSOR) 50 MG tablet   Octreotide (SANDOSTATIN) 100 MCG/ML SOLN injection   Sodium bicarbonate 650 MG tablet   RisperiDONE (RISPERDAL) 2 MG tablet     Arline Kee RN BSN  [facility]     "

## 2024-11-06 ENCOUNTER — LAB (OUTPATIENT)
Dept: LAB | Facility: CLINIC | Age: 54
End: 2024-11-06
Payer: COMMERCIAL

## 2024-11-06 DIAGNOSIS — F25.9 SCHIZOAFFECTIVE DISORDER, SUBCHRONIC CONDITION (H): ICD-10-CM

## 2024-11-06 LAB
BASOPHILS # BLD AUTO: 0.1 10E3/UL (ref 0–0.2)
BASOPHILS NFR BLD AUTO: 1 %
EOSINOPHIL # BLD AUTO: 0.2 10E3/UL (ref 0–0.7)
EOSINOPHIL NFR BLD AUTO: 2 %
ERYTHROCYTE [DISTWIDTH] IN BLOOD BY AUTOMATED COUNT: 13.5 % (ref 10–15)
HCT VFR BLD AUTO: 46.3 % (ref 40–53)
HGB BLD-MCNC: 14.5 G/DL (ref 13.3–17.7)
IMM GRANULOCYTES # BLD: 0 10E3/UL
IMM GRANULOCYTES NFR BLD: 0 %
LYMPHOCYTES # BLD AUTO: 1.8 10E3/UL (ref 0.8–5.3)
LYMPHOCYTES NFR BLD AUTO: 27 %
MCH RBC QN AUTO: 29.4 PG (ref 26.5–33)
MCHC RBC AUTO-ENTMCNC: 31.3 G/DL (ref 31.5–36.5)
MCV RBC AUTO: 94 FL (ref 78–100)
MONOCYTES # BLD AUTO: 0.5 10E3/UL (ref 0–1.3)
MONOCYTES NFR BLD AUTO: 7 %
NEUTROPHILS # BLD AUTO: 4.2 10E3/UL (ref 1.6–8.3)
NEUTROPHILS NFR BLD AUTO: 63 %
PLATELET # BLD AUTO: 246 10E3/UL (ref 150–450)
RBC # BLD AUTO: 4.94 10E6/UL (ref 4.4–5.9)
WBC # BLD AUTO: 6.7 10E3/UL (ref 4–11)

## 2024-11-06 PROCEDURE — 36415 COLL VENOUS BLD VENIPUNCTURE: CPT

## 2024-11-06 PROCEDURE — 85025 COMPLETE CBC W/AUTO DIFF WBC: CPT

## 2024-11-14 ENCOUNTER — LAB (OUTPATIENT)
Dept: LAB | Facility: CLINIC | Age: 54
End: 2024-11-14
Payer: COMMERCIAL

## 2024-11-14 DIAGNOSIS — F25.9 SCHIZOAFFECTIVE DISORDER, SUBCHRONIC CONDITION (H): ICD-10-CM

## 2024-11-14 LAB
BASOPHILS # BLD AUTO: 0.1 10E3/UL (ref 0–0.2)
BASOPHILS NFR BLD AUTO: 1 %
EOSINOPHIL # BLD AUTO: 0.2 10E3/UL (ref 0–0.7)
EOSINOPHIL NFR BLD AUTO: 3 %
ERYTHROCYTE [DISTWIDTH] IN BLOOD BY AUTOMATED COUNT: 13.5 % (ref 10–15)
HCT VFR BLD AUTO: 47.1 % (ref 40–53)
HGB BLD-MCNC: 14.9 G/DL (ref 13.3–17.7)
IMM GRANULOCYTES # BLD: 0 10E3/UL
IMM GRANULOCYTES NFR BLD: 0 %
LYMPHOCYTES # BLD AUTO: 2.3 10E3/UL (ref 0.8–5.3)
LYMPHOCYTES NFR BLD AUTO: 27 %
MCH RBC QN AUTO: 29.3 PG (ref 26.5–33)
MCHC RBC AUTO-ENTMCNC: 31.6 G/DL (ref 31.5–36.5)
MCV RBC AUTO: 93 FL (ref 78–100)
MONOCYTES # BLD AUTO: 0.7 10E3/UL (ref 0–1.3)
MONOCYTES NFR BLD AUTO: 9 %
NEUTROPHILS # BLD AUTO: 5.2 10E3/UL (ref 1.6–8.3)
NEUTROPHILS NFR BLD AUTO: 61 %
PLATELET # BLD AUTO: 253 10E3/UL (ref 150–450)
RBC # BLD AUTO: 5.09 10E6/UL (ref 4.4–5.9)
WBC # BLD AUTO: 8.5 10E3/UL (ref 4–11)

## 2024-11-14 PROCEDURE — 85025 COMPLETE CBC W/AUTO DIFF WBC: CPT

## 2024-11-14 PROCEDURE — 36415 COLL VENOUS BLD VENIPUNCTURE: CPT

## 2024-11-25 DIAGNOSIS — Z79.899 HIGH RISK MEDICATIONS (NOT ANTICOAGULANTS) LONG-TERM USE: Primary | ICD-10-CM

## 2024-11-26 ENCOUNTER — LAB (OUTPATIENT)
Dept: LAB | Facility: CLINIC | Age: 54
End: 2024-11-26
Payer: COMMERCIAL

## 2024-11-26 DIAGNOSIS — Z79.899 HIGH RISK MEDICATIONS (NOT ANTICOAGULANTS) LONG-TERM USE: ICD-10-CM

## 2024-11-26 LAB
BASOPHILS # BLD AUTO: 0.1 10E3/UL (ref 0–0.2)
BASOPHILS NFR BLD AUTO: 1 %
EOSINOPHIL # BLD AUTO: 0.3 10E3/UL (ref 0–0.7)
EOSINOPHIL NFR BLD AUTO: 3 %
ERYTHROCYTE [DISTWIDTH] IN BLOOD BY AUTOMATED COUNT: 14.1 % (ref 10–15)
HCT VFR BLD AUTO: 49.4 % (ref 40–53)
HGB BLD-MCNC: 15 G/DL (ref 13.3–17.7)
IMM GRANULOCYTES # BLD: 0 10E3/UL
IMM GRANULOCYTES NFR BLD: 0 %
LYMPHOCYTES # BLD AUTO: 2.8 10E3/UL (ref 0.8–5.3)
LYMPHOCYTES NFR BLD AUTO: 27 %
MCH RBC QN AUTO: 28.3 PG (ref 26.5–33)
MCHC RBC AUTO-ENTMCNC: 30.4 G/DL (ref 31.5–36.5)
MCV RBC AUTO: 93 FL (ref 78–100)
MONOCYTES # BLD AUTO: 0.8 10E3/UL (ref 0–1.3)
MONOCYTES NFR BLD AUTO: 7 %
NEUTROPHILS # BLD AUTO: 6.5 10E3/UL (ref 1.6–8.3)
NEUTROPHILS NFR BLD AUTO: 62 %
PLATELET # BLD AUTO: 256 10E3/UL (ref 150–450)
RBC # BLD AUTO: 5.3 10E6/UL (ref 4.4–5.9)
WBC # BLD AUTO: 10.4 10E3/UL (ref 4–11)

## 2024-11-26 PROCEDURE — 36415 COLL VENOUS BLD VENIPUNCTURE: CPT

## 2024-11-26 PROCEDURE — 85025 COMPLETE CBC W/AUTO DIFF WBC: CPT

## 2024-12-05 ENCOUNTER — LAB (OUTPATIENT)
Dept: LAB | Facility: CLINIC | Age: 54
End: 2024-12-05
Payer: COMMERCIAL

## 2024-12-05 DIAGNOSIS — Z79.899 HIGH RISK MEDICATIONS (NOT ANTICOAGULANTS) LONG-TERM USE: ICD-10-CM

## 2024-12-05 DIAGNOSIS — F25.9 SCHIZOAFFECTIVE DISORDER, SUBCHRONIC CONDITION (H): ICD-10-CM

## 2024-12-05 LAB
BASOPHILS # BLD AUTO: 0.1 10E3/UL (ref 0–0.2)
BASOPHILS NFR BLD AUTO: 1 %
EOSINOPHIL # BLD AUTO: 0.2 10E3/UL (ref 0–0.7)
EOSINOPHIL NFR BLD AUTO: 3 %
ERYTHROCYTE [DISTWIDTH] IN BLOOD BY AUTOMATED COUNT: 14.1 % (ref 10–15)
HCT VFR BLD AUTO: 49.9 % (ref 40–53)
HGB BLD-MCNC: 15.2 G/DL (ref 13.3–17.7)
IMM GRANULOCYTES # BLD: 0 10E3/UL
IMM GRANULOCYTES NFR BLD: 0 %
LYMPHOCYTES # BLD AUTO: 2.2 10E3/UL (ref 0.8–5.3)
LYMPHOCYTES NFR BLD AUTO: 26 %
MCH RBC QN AUTO: 28 PG (ref 26.5–33)
MCHC RBC AUTO-ENTMCNC: 30.5 G/DL (ref 31.5–36.5)
MCV RBC AUTO: 92 FL (ref 78–100)
MONOCYTES # BLD AUTO: 0.7 10E3/UL (ref 0–1.3)
MONOCYTES NFR BLD AUTO: 8 %
NEUTROPHILS # BLD AUTO: 5.2 10E3/UL (ref 1.6–8.3)
NEUTROPHILS NFR BLD AUTO: 63 %
PLATELET # BLD AUTO: 250 10E3/UL (ref 150–450)
RBC # BLD AUTO: 5.42 10E6/UL (ref 4.4–5.9)
WBC # BLD AUTO: 8.3 10E3/UL (ref 4–11)

## 2024-12-12 ENCOUNTER — LAB (OUTPATIENT)
Dept: LAB | Facility: CLINIC | Age: 54
End: 2024-12-12
Payer: COMMERCIAL

## 2024-12-12 DIAGNOSIS — Z79.899 HIGH RISK MEDICATIONS (NOT ANTICOAGULANTS) LONG-TERM USE: ICD-10-CM

## 2024-12-12 LAB
BASOPHILS # BLD AUTO: 0 10E3/UL (ref 0–0.2)
BASOPHILS NFR BLD AUTO: 0 %
EOSINOPHIL # BLD AUTO: 0.2 10E3/UL (ref 0–0.7)
EOSINOPHIL NFR BLD AUTO: 3 %
ERYTHROCYTE [DISTWIDTH] IN BLOOD BY AUTOMATED COUNT: 14.1 % (ref 10–15)
HCT VFR BLD AUTO: 50.8 % (ref 40–53)
HGB BLD-MCNC: 15.2 G/DL (ref 13.3–17.7)
IMM GRANULOCYTES # BLD: 0 10E3/UL
IMM GRANULOCYTES NFR BLD: 0 %
LYMPHOCYTES # BLD AUTO: 2.1 10E3/UL (ref 0.8–5.3)
LYMPHOCYTES NFR BLD AUTO: 22 %
MCH RBC QN AUTO: 28.4 PG (ref 26.5–33)
MCHC RBC AUTO-ENTMCNC: 29.9 G/DL (ref 31.5–36.5)
MCV RBC AUTO: 95 FL (ref 78–100)
MONOCYTES # BLD AUTO: 0.6 10E3/UL (ref 0–1.3)
MONOCYTES NFR BLD AUTO: 6 %
NEUTROPHILS # BLD AUTO: 6.5 10E3/UL (ref 1.6–8.3)
NEUTROPHILS NFR BLD AUTO: 69 %
PLATELET # BLD AUTO: 251 10E3/UL (ref 150–450)
RBC # BLD AUTO: 5.36 10E6/UL (ref 4.4–5.9)
WBC # BLD AUTO: 9.4 10E3/UL (ref 4–11)

## 2024-12-19 ENCOUNTER — LAB (OUTPATIENT)
Dept: LAB | Facility: CLINIC | Age: 54
End: 2024-12-19
Payer: COMMERCIAL

## 2024-12-19 ENCOUNTER — TELEPHONE (OUTPATIENT)
Dept: FAMILY MEDICINE | Facility: CLINIC | Age: 54
End: 2024-12-19

## 2024-12-19 DIAGNOSIS — Z79.899 HIGH RISK MEDICATIONS (NOT ANTICOAGULANTS) LONG-TERM USE: ICD-10-CM

## 2024-12-19 LAB
BASOPHILS # BLD AUTO: 0 10E3/UL (ref 0–0.2)
BASOPHILS NFR BLD AUTO: 1 %
EOSINOPHIL # BLD AUTO: 0.2 10E3/UL (ref 0–0.7)
EOSINOPHIL NFR BLD AUTO: 2 %
ERYTHROCYTE [DISTWIDTH] IN BLOOD BY AUTOMATED COUNT: 14.8 % (ref 10–15)
HCT VFR BLD AUTO: 48.3 % (ref 40–53)
HGB BLD-MCNC: 14.5 G/DL (ref 13.3–17.7)
IMM GRANULOCYTES # BLD: 0 10E3/UL
IMM GRANULOCYTES NFR BLD: 0 %
LYMPHOCYTES # BLD AUTO: 1.9 10E3/UL (ref 0.8–5.3)
LYMPHOCYTES NFR BLD AUTO: 23 %
MCH RBC QN AUTO: 28.4 PG (ref 26.5–33)
MCHC RBC AUTO-ENTMCNC: 30 G/DL (ref 31.5–36.5)
MCV RBC AUTO: 95 FL (ref 78–100)
MONOCYTES # BLD AUTO: 0.7 10E3/UL (ref 0–1.3)
MONOCYTES NFR BLD AUTO: 9 %
NEUTROPHILS # BLD AUTO: 5.5 10E3/UL (ref 1.6–8.3)
NEUTROPHILS NFR BLD AUTO: 66 %
PLATELET # BLD AUTO: 234 10E3/UL (ref 150–450)
RBC # BLD AUTO: 5.1 10E6/UL (ref 4.4–5.9)
WBC # BLD AUTO: 8.3 10E3/UL (ref 4–11)

## 2024-12-19 NOTE — TELEPHONE ENCOUNTER
Forms received from: [employer]   Phone number listed: [phone]   Fax listed: [phone]  Date received: 12/19/24  Form description: written order  Once forms are completed, please return to na via fax.  Is patient requesting to be contacted when forms are completed: ernie  Phone: na  Form placed: provider jovanny Mora

## 2024-12-26 ENCOUNTER — MEDICAL CORRESPONDENCE (OUTPATIENT)
Dept: HEALTH INFORMATION MANAGEMENT | Facility: CLINIC | Age: 54
End: 2024-12-26

## 2025-03-20 ENCOUNTER — OFFICE VISIT (OUTPATIENT)
Dept: WOUND CARE | Facility: CLINIC | Age: 55
End: 2025-03-20
Payer: COMMERCIAL

## 2025-03-20 DIAGNOSIS — Z93.2 STATUS POST ILEOSTOMY (H): Primary | ICD-10-CM

## 2025-03-20 NOTE — PROGRESS NOTES
"[facility] Ostomy Assessment  Patient comes to clinic for consultation regarding ostomy issues.    Procedure: laparoscopic converted to open total abdominal colectomy 5/11/2016 at [facility]  Dx related to ostomy:Megacolon obstructions  Consulted per Dr Latonia Neville    Subjective:He is here with staff member from [facility] and ostomy care is provided by self. Pt is completely soiled with stool, His pouch is taped on with Ducktape. He can not go back home in these clothes so he was provided with patient shorts and a gown. Patient's reason for visit: \"Patient is requesting an ileostomy takedown.\"     The quantity of ostomy supplies needed by a beneficiary is determined primarily by the type of ostomy, its location, its construction, and the condition of the skin surface surrounding the stoma.    Objective:  Type: Ileostomy since 2016  Stoma: 28 mm  end normal-appearing   Location: right upper quadrant     Peristomal skin: sever erosion of epidermis  and barrier is taped on with duct tape  Output:  watery and soft    Frequency of pouch change:Per staff member at [facility] pouches are replaced by pt several times per day. They are running out of pouches due to frequent leaking which is caused by severe skin breakdown  Ordering supplies from:  [employer], Fax: [phone], Customer Service: [phone]    Current pouch system/supplies: [facility]   one piece and flat    Assessment: Patient arrives to clinic with his current group home RN who does all his pouch cares.  She states the skin around the stoma is doing well, without leaking in between changes and that is not the reason of his visit  His pouch gets changed every other day and patient empties the pouch himself.  He has no more complaints with leaking at all.  He is requesting an ileostomy takedown and believes that he did not get a total colectomy when his colectomy was performed in [place] in 2016.  Since he is still having some drainage per rectum I suspect " "his rectum is still in place however all his records were from [place] and an op note was not found.  He will need to discuss this with a colorectal team.  Pt will continue with convex and belt with miguel a ring due to watery output.     Intervention/Plan: Continue current pouches, samples for the Miguel A ring were given since this was already substituted with an adapt ring.  Explained that the adapt ring does not hold up to liquid output very well..  Recommend they do not use Cavilon and advised that warming up after the pouch has been placed will work better.  There has been a problem with cavilon building up that is difficult to remove.  Since patient receives all his care at [facility] I recommend that he follows up with a colorectal doctor there to discuss possible ileostomy takedown and ileorectal anastomosis.  Continue with his current products.    [place] 8283 (Precut to 1 1/8\", Miguel A ring [id number] (not substitutes), belt 7299    Return to clinic 1 month(s)  . Treated and follow-up appointment made     Alexsandra Villareal NP  was available for supervision of care if needed or if questions should arise and regarding plan of care. Chary Dos Santos, RN  RN [facility]      "

## 2025-04-18 ENCOUNTER — TELEPHONE (OUTPATIENT)
Dept: FAMILY MEDICINE | Facility: CLINIC | Age: 55
End: 2025-04-18
Payer: COMMERCIAL

## 2025-04-18 NOTE — TELEPHONE ENCOUNTER
Forms received from: [employer]   Phone number listed:    Fax listed: [phone]  Date received: 4/18/25  Form description: standard written order  Once forms are completed, please return to  via .  Is patient requesting to be contacted when forms are completed:   Phone:   Form placed: provider jovanny Mora

## 2025-04-28 ENCOUNTER — MEDICAL CORRESPONDENCE (OUTPATIENT)
Dept: HEALTH INFORMATION MANAGEMENT | Facility: CLINIC | Age: 55
End: 2025-04-28
Payer: COMMERCIAL

## 2025-06-05 ENCOUNTER — MEDICAL CORRESPONDENCE (OUTPATIENT)
Dept: HEALTH INFORMATION MANAGEMENT | Facility: CLINIC | Age: 55
End: 2025-06-05
Payer: COMMERCIAL

## 2025-06-25 ENCOUNTER — TELEPHONE (OUTPATIENT)
Dept: FAMILY MEDICINE | Facility: CLINIC | Age: 55
End: 2025-06-25
Payer: COMMERCIAL

## 2025-06-25 NOTE — TELEPHONE ENCOUNTER
Patient Quality Outreach    Patient is due for the following:   Diabetes -  A1C, Eye Exam, Microalbumin, and Foot Exam  Hypertension -  BP check  Colon Cancer Screening  Depression  -  PHQ-9 needed and DAP  Physical Preventive Adult Physical      Topic Date Due    Hepatitis B Vaccine (1 of 3 - 19+ 3-dose series) Never done    Zoster (Shingles) Vaccine (1 of 2) Never done    Pneumococcal Vaccine (2 of 2 - PCV) 10/13/2021    Diptheria Tetanus Pertussis (DTAP/TDAP/TD) Vaccine (2 - Td or Tdap) 04/25/2023       Action(s) Taken:   Schedule a Adult Preventative    Type of outreach:    Phone, patient not available.     Questions for provider review:    Per care everywhere patient had a s/p total colectomy on 5/11/2016. Please advise on colon ca screening metric.          Concha Nielsen MA  Chart routed to Provider and Abstraction.